# Patient Record
Sex: MALE | Race: BLACK OR AFRICAN AMERICAN | NOT HISPANIC OR LATINO | Employment: FULL TIME | URBAN - METROPOLITAN AREA
[De-identification: names, ages, dates, MRNs, and addresses within clinical notes are randomized per-mention and may not be internally consistent; named-entity substitution may affect disease eponyms.]

---

## 2019-06-28 ENCOUNTER — OFFICE VISIT (OUTPATIENT)
Dept: INTERNAL MEDICINE CLINIC | Facility: CLINIC | Age: 24
End: 2019-06-28
Payer: COMMERCIAL

## 2019-06-28 VITALS
SYSTOLIC BLOOD PRESSURE: 136 MMHG | OXYGEN SATURATION: 98 % | TEMPERATURE: 99.2 F | DIASTOLIC BLOOD PRESSURE: 86 MMHG | HEART RATE: 87 BPM | WEIGHT: 312.8 LBS | BODY MASS INDEX: 40.71 KG/M2

## 2019-06-28 DIAGNOSIS — E66.01 MORBID OBESITY (HCC): ICD-10-CM

## 2019-06-28 DIAGNOSIS — E55.9 VITAMIN D DEFICIENCY: ICD-10-CM

## 2019-06-28 DIAGNOSIS — E78.5 HYPERLIPIDEMIA, UNSPECIFIED HYPERLIPIDEMIA TYPE: ICD-10-CM

## 2019-06-28 DIAGNOSIS — R41.840 ATTENTION DEFICIT: Primary | ICD-10-CM

## 2019-06-28 PROCEDURE — 99203 OFFICE O/P NEW LOW 30 MIN: CPT | Performed by: NURSE PRACTITIONER

## 2019-06-28 PROCEDURE — 1036F TOBACCO NON-USER: CPT | Performed by: NURSE PRACTITIONER

## 2019-06-28 RX ORDER — ATOMOXETINE HYDROCHLORIDE 40 MG/1
1 CAPSULE ORAL 2 TIMES DAILY
COMMUNITY
Start: 2015-12-23 | End: 2019-06-28

## 2019-06-28 RX ORDER — DEXTROAMPHETAMINE SACCHARATE, AMPHETAMINE ASPARTATE MONOHYDRATE, DEXTROAMPHETAMINE SULFATE AND AMPHETAMINE SULFATE 2.5; 2.5; 2.5; 2.5 MG/1; MG/1; MG/1; MG/1
10 CAPSULE, EXTENDED RELEASE ORAL EVERY MORNING
Qty: 30 CAPSULE | Refills: 0 | Status: SHIPPED | OUTPATIENT
Start: 2019-06-28 | End: 2019-07-29 | Stop reason: SDUPTHER

## 2019-06-29 LAB
25(OH)D3 SERPL-MCNC: 10 NG/ML (ref 30–100)
ALBUMIN SERPL-MCNC: 4.3 G/DL (ref 3.6–5.1)
ALBUMIN/GLOB SERPL: 1.3 (CALC) (ref 1–2.5)
ALP SERPL-CCNC: 72 U/L (ref 40–115)
ALT SERPL-CCNC: 11 U/L (ref 9–46)
AST SERPL-CCNC: 15 U/L (ref 10–40)
BASOPHILS # BLD AUTO: 31 CELLS/UL (ref 0–200)
BASOPHILS NFR BLD AUTO: 0.6 %
BILIRUB SERPL-MCNC: 0.5 MG/DL (ref 0.2–1.2)
BUN SERPL-MCNC: 17 MG/DL (ref 7–25)
BUN/CREAT SERPL: NORMAL (CALC) (ref 6–22)
CALCIUM SERPL-MCNC: 9.2 MG/DL (ref 8.6–10.3)
CHLORIDE SERPL-SCNC: 104 MMOL/L (ref 98–110)
CHOLEST SERPL-MCNC: 239 MG/DL
CHOLEST/HDLC SERPL: 6.3 (CALC)
CO2 SERPL-SCNC: 24 MMOL/L (ref 20–32)
CREAT SERPL-MCNC: 1.04 MG/DL (ref 0.6–1.35)
EOSINOPHIL # BLD AUTO: 41 CELLS/UL (ref 15–500)
EOSINOPHIL NFR BLD AUTO: 0.8 %
ERYTHROCYTE [DISTWIDTH] IN BLOOD BY AUTOMATED COUNT: 13.6 % (ref 11–15)
GLOBULIN SER CALC-MCNC: 3.2 G/DL (CALC) (ref 1.9–3.7)
GLUCOSE SERPL-MCNC: 79 MG/DL (ref 65–99)
HCT VFR BLD AUTO: 46.5 % (ref 38.5–50)
HDLC SERPL-MCNC: 38 MG/DL
HGB BLD-MCNC: 15.4 G/DL (ref 13.2–17.1)
LDLC SERPL CALC-MCNC: 178 MG/DL (CALC)
LYMPHOCYTES # BLD AUTO: 1469 CELLS/UL (ref 850–3900)
LYMPHOCYTES NFR BLD AUTO: 28.8 %
MCH RBC QN AUTO: 27.4 PG (ref 27–33)
MCHC RBC AUTO-ENTMCNC: 33.1 G/DL (ref 32–36)
MCV RBC AUTO: 82.6 FL (ref 80–100)
MONOCYTES # BLD AUTO: 479 CELLS/UL (ref 200–950)
MONOCYTES NFR BLD AUTO: 9.4 %
NEUTROPHILS # BLD AUTO: 3080 CELLS/UL (ref 1500–7800)
NEUTROPHILS NFR BLD AUTO: 60.4 %
NONHDLC SERPL-MCNC: 201 MG/DL (CALC)
PLATELET # BLD AUTO: 245 THOUSAND/UL (ref 140–400)
PMV BLD REES-ECKER: 11.5 FL (ref 7.5–12.5)
POTASSIUM SERPL-SCNC: 5 MMOL/L (ref 3.5–5.3)
PROT SERPL-MCNC: 7.5 G/DL (ref 6.1–8.1)
RBC # BLD AUTO: 5.63 MILLION/UL (ref 4.2–5.8)
SL AMB EGFR AFRICAN AMERICAN: 118 ML/MIN/1.73M2
SL AMB EGFR NON AFRICAN AMERICAN: 102 ML/MIN/1.73M2
SODIUM SERPL-SCNC: 138 MMOL/L (ref 135–146)
TRIGL SERPL-MCNC: 105 MG/DL
WBC # BLD AUTO: 5.1 THOUSAND/UL (ref 3.8–10.8)

## 2019-07-01 ENCOUNTER — TELEPHONE (OUTPATIENT)
Dept: INTERNAL MEDICINE CLINIC | Age: 24
End: 2019-07-01

## 2019-07-01 DIAGNOSIS — E55.9 VITAMIN D DEFICIENCY: Primary | ICD-10-CM

## 2019-07-01 RX ORDER — ERGOCALCIFEROL 1.25 MG/1
50000 CAPSULE ORAL WEEKLY
Qty: 12 CAPSULE | Refills: 0 | Status: SHIPPED | OUTPATIENT
Start: 2019-07-01

## 2019-07-01 NOTE — TELEPHONE ENCOUNTER
Left detailed message on machine that vitamin d is low at 10 and will start prescription strength Vit d once weekly for 12 weeks  Advised that [facility] [place]  Advised cholesterol was high but will address at follow up later this month  Asked to call back if any questions

## 2019-07-29 ENCOUNTER — OFFICE VISIT (OUTPATIENT)
Dept: INTERNAL MEDICINE CLINIC | Facility: CLINIC | Age: 24
End: 2019-07-29
Payer: COMMERCIAL

## 2019-07-29 VITALS
DIASTOLIC BLOOD PRESSURE: 80 MMHG | BODY MASS INDEX: 41.45 KG/M2 | SYSTOLIC BLOOD PRESSURE: 128 MMHG | HEART RATE: 81 BPM | TEMPERATURE: 98.5 F | HEIGHT: 72 IN | WEIGHT: 306 LBS | OXYGEN SATURATION: 98 %

## 2019-07-29 DIAGNOSIS — E55.9 VITAMIN D DEFICIENCY: ICD-10-CM

## 2019-07-29 DIAGNOSIS — E66.01 MORBID OBESITY (HCC): ICD-10-CM

## 2019-07-29 DIAGNOSIS — R41.840 ATTENTION DEFICIT: Primary | ICD-10-CM

## 2019-07-29 DIAGNOSIS — E78.5 HYPERLIPIDEMIA, UNSPECIFIED HYPERLIPIDEMIA TYPE: ICD-10-CM

## 2019-07-29 PROCEDURE — 1036F TOBACCO NON-USER: CPT | Performed by: NURSE PRACTITIONER

## 2019-07-29 PROCEDURE — 99214 OFFICE O/P EST MOD 30 MIN: CPT | Performed by: NURSE PRACTITIONER

## 2019-07-29 PROCEDURE — 3008F BODY MASS INDEX DOCD: CPT | Performed by: NURSE PRACTITIONER

## 2019-07-29 RX ORDER — DEXTROAMPHETAMINE SACCHARATE, AMPHETAMINE ASPARTATE MONOHYDRATE, DEXTROAMPHETAMINE SULFATE AND AMPHETAMINE SULFATE 5; 5; 5; 5 MG/1; MG/1; MG/1; MG/1
20 CAPSULE, EXTENDED RELEASE ORAL EVERY MORNING
Qty: 30 CAPSULE | Refills: 0 | Status: SHIPPED | OUTPATIENT
Start: 2019-07-29 | End: 2019-09-06 | Stop reason: SDUPTHER

## 2019-07-29 NOTE — PROGRESS NOTES
Assessment/Plan:       Problem List Items Addressed This Visit        Other    Attention deficit - Primary     Increase Adderall XR to 20mg daily  Follow up 3 months          Relevant Medications    amphetamine-dextroamphetamine (ADDERALL XR) 20 MG 24 hr capsule    Hyperlipidemia     Discussed healthy diet  Encouraged to start exercising  Recheck in 3 months          Relevant Orders    Lipid Panel with Direct LDL reflex    Vitamin D deficiency     Continue ergocalciferol 50,000 weekly  Once completed, started vitamin D 3 2000 units daily         Morbid obesity ([address] )     Lost 6 lbs in last month  Started Keto diet  Encouraged exercise               BMI Counseling: Body mass index is 41 5 kg/m²  Discussed the patient's BMI with him  The BMI is above average  BMI counseling and education was provided to the patient  Nutrition recommendations include reducing portion sizes, decreasing overall calorie intake, reducing intake of saturated fat and trans fat and reducing intake of cholesterol  Exercise recommendations include moderate aerobic physical activity for 150 minutes/week  Patient is UTD on Tdap    Subjective:      Patient ID: Sallie Chowdhury is a 26 y o  male  HPI     ADHD   Patient presents today for 1 month follow up ADHD  He was started on Adderrall XR 10mg daily  He is taking it daily in the morning  He is not noticing a significant difference throughout the day  When he is doing homework at night he is noticing some improvement  The first week on the medication he was mildly constipated and feeling tired  He reports s e  Resolved  Vitamin D deficiency  Last labs 6/28/2019 - Vitamin D hydroxy 10  He has since started ergocalciferol 64958 units weekly  +fatigue    Hyperlipidemia  Yumi Faria is a 26 y o  male who presents for follow up of dyslipidemia  A repeat fasting lipid profile was done  Total cholesterol 239 ; Triglycerides 105 ; HDL 38 ;     Previous history of cardiac disease includes: none Lipid abnormalities are newly identified  The patient exercises never  Morbid obesity  Recently started keto diet  He has lost 6 lbs in the last month  No routine exercise    The following portions of the patient's history were reviewed and updated as appropriate: allergies, current medications, past family history, past medical history, past social history, past surgical history and problem list     Review of Systems   Constitutional: Negative for activity change, appetite change, chills and fever  Respiratory: Negative for cough, chest tightness, shortness of breath and wheezing  Cardiovascular: Negative for chest pain, palpitations and leg swelling  Gastrointestinal: Positive for abdominal pain (epigastric 1 episoide in past month)  Negative for abdominal distention, blood in stool, constipation, diarrhea, nausea and vomiting  Neurological: Negative for dizziness, light-headedness and headaches  Past Medical History:   Diagnosis Date    ADHD          Current Outpatient Medications:     amphetamine-dextroamphetamine (ADDERALL XR) 20 MG 24 hr capsule, Take 1 capsule (20 mg total) by mouth every morningMax Daily Amount: 20 mg, Disp: 30 capsule, Rfl: 0    ergocalciferol (VITAMIN D2) 50,000 units, Take 1 capsule (50,000 Units total) by mouth once a week, Disp: 12 capsule, Rfl: 0    No Known Allergies    Social History   Past Surgical History:   Procedure Laterality Date    NO PAST SURGERIES       Family History   Problem Relation Age of Onset    Asthma Mother     Hypertension Mother     Asthma Brother     No Known Problems Father        Objective:  /80 (BP Location: Left arm, Patient Position: Sitting, Cuff Size: Large)   Pulse 81   Temp 98 5 °F (36 9 °C) (Oral)   Ht 6' (1 829 m)   Wt (!) 139 kg (306 lb)   SpO2 98% Comment: ra  BMI 41 50 kg/m²      Physical Exam   Constitutional: He is oriented to person, place, and time  He appears well-developed and well-nourished   No distress  Morbidly obese   HENT:   Head: Normocephalic and atraumatic  Eyes: Pupils are equal, round, and reactive to light  Conjunctivae and EOM are normal    Neck: Normal range of motion  Neck supple  Carotid bruit is not present  Cardiovascular: Normal rate, regular rhythm and normal heart sounds  No murmur heard  Pulmonary/Chest: Effort normal and breath sounds normal  No respiratory distress  He has no wheezes  He has no rales  Abdominal: Soft  Bowel sounds are normal  He exhibits no distension and no mass  There is no tenderness  Musculoskeletal: He exhibits no edema  Neurological: He is alert and oriented to person, place, and time  Skin: Skin is warm and dry  Capillary refill takes less than 2 seconds  He is not diaphoretic  Psychiatric: He has a normal mood and affect  His behavior is normal    Vitals reviewed

## 2019-07-29 NOTE — PATIENT INSTRUCTIONS
Increase Adderrall to 20mg once daily  New script sent to pharmacy today     Healthy diet to lower cholesterol -  Low cholesterol  Low fat dairy, lean protein, increase fish and healthy fats (canola oil, olive oil, salmon, avocado)   Recheck cholesterol in 3 months prior to follow up      Continue vitamin d weekly supplement   Once finished start Vitamin D3 2000 units daily over the counter

## 2019-09-06 DIAGNOSIS — R41.840 ATTENTION DEFICIT: ICD-10-CM

## 2019-09-06 RX ORDER — DEXTROAMPHETAMINE SACCHARATE, AMPHETAMINE ASPARTATE MONOHYDRATE, DEXTROAMPHETAMINE SULFATE AND AMPHETAMINE SULFATE 5; 5; 5; 5 MG/1; MG/1; MG/1; MG/1
20 CAPSULE, EXTENDED RELEASE ORAL EVERY MORNING
Qty: 30 CAPSULE | Refills: 0 | Status: SHIPPED | OUTPATIENT
Start: 2019-09-06 | End: 2019-10-03 | Stop reason: SDUPTHER

## 2019-09-06 NOTE — TELEPHONE ENCOUNTER
MED: [facility]   SUPPLY: [employer]  PHARMACY: [facility]   PATIENT PHONE #: [phone]  LAST OV: 7/29/2019  UPCOMING OV: 10/4/2019

## 2019-10-03 ENCOUNTER — OFFICE VISIT (OUTPATIENT)
Dept: INTERNAL MEDICINE CLINIC | Facility: CLINIC | Age: 24
End: 2019-10-03
Payer: COMMERCIAL

## 2019-10-03 VITALS
SYSTOLIC BLOOD PRESSURE: 130 MMHG | HEIGHT: 72 IN | TEMPERATURE: 98.3 F | WEIGHT: 308 LBS | DIASTOLIC BLOOD PRESSURE: 90 MMHG | BODY MASS INDEX: 41.72 KG/M2 | HEART RATE: 84 BPM | OXYGEN SATURATION: 97 %

## 2019-10-03 DIAGNOSIS — E78.5 HYPERLIPIDEMIA, UNSPECIFIED HYPERLIPIDEMIA TYPE: ICD-10-CM

## 2019-10-03 DIAGNOSIS — E66.01 MORBID OBESITY (HCC): ICD-10-CM

## 2019-10-03 DIAGNOSIS — E55.9 VITAMIN D DEFICIENCY: ICD-10-CM

## 2019-10-03 DIAGNOSIS — R41.840 ATTENTION DEFICIT: Primary | ICD-10-CM

## 2019-10-03 DIAGNOSIS — R03.0 ELEVATED BP WITHOUT DIAGNOSIS OF HYPERTENSION: ICD-10-CM

## 2019-10-03 PROCEDURE — 3008F BODY MASS INDEX DOCD: CPT | Performed by: NURSE PRACTITIONER

## 2019-10-03 PROCEDURE — 99214 OFFICE O/P EST MOD 30 MIN: CPT | Performed by: NURSE PRACTITIONER

## 2019-10-03 RX ORDER — DEXTROAMPHETAMINE SACCHARATE, AMPHETAMINE ASPARTATE MONOHYDRATE, DEXTROAMPHETAMINE SULFATE AND AMPHETAMINE SULFATE 5; 5; 5; 5 MG/1; MG/1; MG/1; MG/1
20 CAPSULE, EXTENDED RELEASE ORAL EVERY MORNING
Qty: 30 CAPSULE | Refills: 0 | Status: SHIPPED | OUTPATIENT
Start: 2019-10-03 | End: 2019-10-25 | Stop reason: SDUPTHER

## 2019-10-03 NOTE — ASSESSMENT & PLAN NOTE
Significantly elevated LDL on labs 3 months ago  Advised patient to go for repeat labs  Discussed importance of dietary changes and exercise

## 2019-10-03 NOTE — ASSESSMENT & PLAN NOTE
Continue ergo calciferol 61873 units weekly  Once completed start vitamin-D 3 2000 International Units daily

## 2019-10-03 NOTE — PATIENT INSTRUCTIONS
Continue same medications  When you complete the vitamin D start Vitamin D3 2000 units daily over the counter    Work on diet and exercise    Go for labs    Follow up in 3 months

## 2019-10-03 NOTE — ASSESSMENT & PLAN NOTE
Offered weight management referral but patient declined  Discussed with him importance of starting daily routine of healthy diet with exercise

## 2019-10-25 DIAGNOSIS — R41.840 ATTENTION DEFICIT: ICD-10-CM

## 2019-10-26 RX ORDER — DEXTROAMPHETAMINE SACCHARATE, AMPHETAMINE ASPARTATE MONOHYDRATE, DEXTROAMPHETAMINE SULFATE AND AMPHETAMINE SULFATE 5; 5; 5; 5 MG/1; MG/1; MG/1; MG/1
20 CAPSULE, EXTENDED RELEASE ORAL EVERY MORNING
Qty: 30 CAPSULE | Refills: 0 | Status: SHIPPED | OUTPATIENT
Start: 2019-10-26 | End: 2019-12-11 | Stop reason: SDUPTHER

## 2019-10-31 ENCOUNTER — APPOINTMENT (OUTPATIENT)
Dept: LAB | Age: 24
End: 2019-10-31
Payer: COMMERCIAL

## 2019-10-31 DIAGNOSIS — E78.5 HYPERLIPIDEMIA, UNSPECIFIED HYPERLIPIDEMIA TYPE: ICD-10-CM

## 2019-10-31 LAB
CHOLEST SERPL-MCNC: 219 MG/DL (ref 50–200)
HDLC SERPL-MCNC: 34 MG/DL
LDLC SERPL CALC-MCNC: 159 MG/DL (ref 0–100)
TRIGL SERPL-MCNC: 129 MG/DL

## 2019-10-31 PROCEDURE — 80061 LIPID PANEL: CPT

## 2019-10-31 PROCEDURE — 36415 COLL VENOUS BLD VENIPUNCTURE: CPT

## 2019-12-11 DIAGNOSIS — R41.840 ATTENTION DEFICIT: ICD-10-CM

## 2019-12-11 NOTE — TELEPHONE ENCOUNTER
Last O/V: 10/03/19  Next O/V: 01/06/20    Medication requested:  Adderall 20 MG   Pharmacy: [facility] in [place]      PLEASE ADVISE CALEB NO LONGER IN OFFICE

## 2019-12-14 RX ORDER — DEXTROAMPHETAMINE SACCHARATE, AMPHETAMINE ASPARTATE MONOHYDRATE, DEXTROAMPHETAMINE SULFATE AND AMPHETAMINE SULFATE 5; 5; 5; 5 MG/1; MG/1; MG/1; MG/1
20 CAPSULE, EXTENDED RELEASE ORAL EVERY MORNING
Qty: 30 CAPSULE | Refills: 0 | Status: SHIPPED | OUTPATIENT
Start: 2019-12-14 | End: 2020-02-27 | Stop reason: SDUPTHER

## 2020-02-27 DIAGNOSIS — R41.840 ATTENTION DEFICIT: ICD-10-CM

## 2020-02-27 RX ORDER — DEXTROAMPHETAMINE SACCHARATE, AMPHETAMINE ASPARTATE MONOHYDRATE, DEXTROAMPHETAMINE SULFATE AND AMPHETAMINE SULFATE 5; 5; 5; 5 MG/1; MG/1; MG/1; MG/1
20 CAPSULE, EXTENDED RELEASE ORAL EVERY MORNING
Qty: 30 CAPSULE | Refills: 0 | Status: SHIPPED | OUTPATIENT
Start: 2020-02-27 | End: 2020-05-19 | Stop reason: SDUPTHER

## 2020-03-13 ENCOUNTER — OFFICE VISIT (OUTPATIENT)
Dept: INTERNAL MEDICINE CLINIC | Facility: CLINIC | Age: 25
End: 2020-03-13
Payer: COMMERCIAL

## 2020-03-13 VITALS
WEIGHT: 315 LBS | TEMPERATURE: 98.8 F | DIASTOLIC BLOOD PRESSURE: 88 MMHG | BODY MASS INDEX: 42.66 KG/M2 | HEIGHT: 72 IN | OXYGEN SATURATION: 96 % | HEART RATE: 88 BPM | SYSTOLIC BLOOD PRESSURE: 124 MMHG

## 2020-03-13 DIAGNOSIS — R41.840 ATTENTION DEFICIT: Primary | ICD-10-CM

## 2020-03-13 DIAGNOSIS — E66.01 MORBID OBESITY (HCC): ICD-10-CM

## 2020-03-13 DIAGNOSIS — E55.9 VITAMIN D DEFICIENCY: ICD-10-CM

## 2020-03-13 DIAGNOSIS — R03.0 ELEVATED BP WITHOUT DIAGNOSIS OF HYPERTENSION: ICD-10-CM

## 2020-03-13 DIAGNOSIS — E78.5 HYPERLIPIDEMIA, UNSPECIFIED HYPERLIPIDEMIA TYPE: ICD-10-CM

## 2020-03-13 DIAGNOSIS — K21.9 GASTROESOPHAGEAL REFLUX DISEASE, ESOPHAGITIS PRESENCE NOT SPECIFIED: ICD-10-CM

## 2020-03-13 PROCEDURE — 3008F BODY MASS INDEX DOCD: CPT | Performed by: NURSE PRACTITIONER

## 2020-03-13 PROCEDURE — 99214 OFFICE O/P EST MOD 30 MIN: CPT | Performed by: NURSE PRACTITIONER

## 2020-03-13 PROCEDURE — 1036F TOBACCO NON-USER: CPT | Performed by: NURSE PRACTITIONER

## 2020-03-13 RX ORDER — DEXTROAMPHETAMINE SACCHARATE, AMPHETAMINE ASPARTATE, DEXTROAMPHETAMINE SULFATE AND AMPHETAMINE SULFATE 1.25; 1.25; 1.25; 1.25 MG/1; MG/1; MG/1; MG/1
TABLET ORAL
Qty: 30 TABLET | Refills: 0 | Status: SHIPPED | OUTPATIENT
Start: 2020-03-13 | End: 2020-05-19 | Stop reason: SDUPTHER

## 2020-03-13 NOTE — ASSESSMENT & PLAN NOTE
Continue Adderall XR 20 mg daily in the morning  Start Adderall immediate release 5 mg in the afternoon around 2-3 p m

## 2020-03-13 NOTE — PROGRESS NOTES
Assessment/Plan:    Problem List Items Addressed This Visit        Digestive    Acid reflux     Intermittent  Educated on lifestyle changes- foods to avoid, small portions throughout the day, avoid eating close to bedtime  Can use Pepcid 10 mg over-the-counter 1 to 2 times a day as needed            Other    Attention deficit - Primary     Continue Adderall XR 20 mg daily in the morning  Start Adderall immediate release 5 mg in the afternoon around 2-3 p m  Relevant Medications    amphetamine-dextroamphetamine (ADDERALL) 5 MG tablet    Hyperlipidemia     Discussed importance of exercise routine and healthy diet changes  Update lipid panel         Relevant Orders    CBC and differential    Comprehensive metabolic panel    Lipid Panel with Direct LDL reflex    Vitamin D deficiency     Continue vitamin-D 2000 International Units daily  Recheck vitamin-D level         Relevant Orders    Vitamin D 25 hydroxy    Morbid obesity ([address] )     Discussed with patient importance of weight loss  Educated on at exercise and diet         Relevant Orders    Lipid Panel with Direct LDL reflex    Elevated BP without diagnosis of hypertension     Educated on dietary changes as well as importance of weight loss  Continue to monitor               BMI Counseling: Body mass index is 43 88 kg/m²  Discussed the patient's BMI with him  The BMI is above normal  Nutrition recommendations include reducing portion sizes, decreasing overall calorie intake, 3-5 servings of fruits/vegetables daily and reducing fast food intake  Exercise recommendations include moderate aerobic physical activity for 150 minutes/week  He is not exercising or eating well  Discussed at length improvements to make to his diet and importance of weight loss  Discussed at length importance of exercising routinely  Recommended he start using his treadmill at home for at least 30 minutes a day    M*Modal software was used to dictate this note    It may contain errors with dictating incorrect words or incorrect spelling  Please contact the provider directly with any questions  Subjective:      Patient ID: Tigist Zapata is a 27 y o  male  HPI     Patient presents for 6 month follow up of ADD  He is currently on Adderall XR 20mg which he feels like is working well for him to focus  He does feel that he starts "crashing" around 3pm  This is difficult for him because he feels extremely tired at 3pm and then needs to get his homework done  He has tried not taking the Adderall and when he doesn't take it he doesn't experience that crash  Hyperlipidemia - will update lipid panel  No current medications  Diet has been poor without exercise    Vitamin-D deficiency - patient is currently taking over-the-counter vitamin-D 2000 International Units daily  Last vitamin-D level 10  Update labs    Morbid obesity- continue to educate patient on the importance of weight loss  Discussed at length diet and exercise  Reflux- patient has noted that he has been experiencing acid reflux intermittently  Typically occurs with certain foods or if he eats too close to bedtime  The following portions of the patient's history were reviewed and updated as appropriate: allergies, current medications, past family history, past medical history, past social history, past surgical history and problem list     Review of Systems   Constitutional: Negative for activity change, appetite change, chills and fever  Respiratory: Negative for chest tightness, shortness of breath and wheezing  Cardiovascular: Negative for chest pain, palpitations and leg swelling  Gastrointestinal: Negative for abdominal distention, abdominal pain, constipation, diarrhea, nausea and vomiting  Neurological: Negative for dizziness, light-headedness and headaches  Psychiatric/Behavioral: Negative for decreased concentration  The patient is not nervous/anxious            Past Medical History:   Diagnosis Date  ADHD          Current Outpatient Medications:     amphetamine-dextroamphetamine (ADDERALL XR) 20 MG 24 hr capsule, Take 1 capsule (20 mg total) by mouth every morningMax Daily Amount: 20 mg, Disp: 30 capsule, Rfl: 0    amphetamine-dextroamphetamine (ADDERALL) 5 MG tablet, Take 5mg daily by mouth every afternoon, Disp: 30 tablet, Rfl: 0    ergocalciferol (VITAMIN D2) 50,000 units, Take 1 capsule (50,000 Units total) by mouth once a week, Disp: 12 capsule, Rfl: 0    No Known Allergies    Social History   Past Surgical History:   Procedure Laterality Date    NO PAST SURGERIES       Family History   Problem Relation Age of Onset    Asthma Mother     Hypertension Mother     Asthma Brother     No Known Problems Father        Objective:  /88 (BP Location: Left arm, Patient Position: Sitting, Cuff Size: Large)   Pulse 88   Temp 98 8 °F (37 1 °C) (Oral)   Ht 6' 0 05" (1 83 m)   Wt (!) 147 kg (324 lb)   SpO2 96%   BMI 43 88 kg/m²      Physical Exam   Constitutional: He is oriented to person, place, and time  He appears well-developed and well-nourished  No distress  Morbidly obese   HENT:   Head: Normocephalic and atraumatic  Right Ear: Tympanic membrane and external ear normal    Left Ear: Tympanic membrane and external ear normal    Nose: Nose normal    Mouth/Throat: Oropharynx is clear and moist  No oropharyngeal exudate, posterior oropharyngeal edema or posterior oropharyngeal erythema  Eyes: Pupils are equal, round, and reactive to light  Conjunctivae and EOM are normal    Neck: Normal range of motion  Neck supple  Cardiovascular: Normal rate, regular rhythm and normal heart sounds  No murmur heard  Pulmonary/Chest: Effort normal and breath sounds normal  No respiratory distress  He has no decreased breath sounds  He has no wheezes  He has no rhonchi  Musculoskeletal: He exhibits no edema  Lymphadenopathy:     He has no cervical adenopathy     Neurological: He is alert and oriented to person, place, and time  Skin: Skin is warm and dry  He is not diaphoretic  Psychiatric: He has a normal mood and affect  His behavior is normal    Vitals reviewed

## 2020-03-13 NOTE — ASSESSMENT & PLAN NOTE
Intermittent  Educated on lifestyle changes- foods to avoid, small portions throughout the day, avoid eating close to bedtime  Can use Pepcid 10 mg over-the-counter 1 to 2 times a day as needed

## 2020-03-13 NOTE — PATIENT INSTRUCTIONS
Continue adderall XR 20mg daily in the morning and start immediate release 5mg around 2-230pm    Start exercising by walking 30 minutes a day on the treadmill - continue to increase intensity     Healthy diet - more fruits and vegetables, less processed foods     Can start OTC pepcid 10mg 1-2x a day as needed for acid reflux    Go for fasting labs prior to follow up in 3 months

## 2020-05-19 DIAGNOSIS — R41.840 ATTENTION DEFICIT: ICD-10-CM

## 2020-05-19 RX ORDER — DEXTROAMPHETAMINE SACCHARATE, AMPHETAMINE ASPARTATE, DEXTROAMPHETAMINE SULFATE AND AMPHETAMINE SULFATE 1.25; 1.25; 1.25; 1.25 MG/1; MG/1; MG/1; MG/1
TABLET ORAL
Qty: 30 TABLET | Refills: 0 | Status: SHIPPED | OUTPATIENT
Start: 2020-05-19

## 2020-05-19 RX ORDER — DEXTROAMPHETAMINE SACCHARATE, AMPHETAMINE ASPARTATE MONOHYDRATE, DEXTROAMPHETAMINE SULFATE AND AMPHETAMINE SULFATE 5; 5; 5; 5 MG/1; MG/1; MG/1; MG/1
20 CAPSULE, EXTENDED RELEASE ORAL EVERY MORNING
Qty: 30 CAPSULE | Refills: 0 | Status: SHIPPED | OUTPATIENT
Start: 2020-05-19 | End: 2020-06-30 | Stop reason: SDUPTHER

## 2020-06-30 ENCOUNTER — OFFICE VISIT (OUTPATIENT)
Dept: INTERNAL MEDICINE CLINIC | Facility: CLINIC | Age: 25
End: 2020-06-30
Payer: COMMERCIAL

## 2020-06-30 VITALS
DIASTOLIC BLOOD PRESSURE: 82 MMHG | TEMPERATURE: 98.3 F | BODY MASS INDEX: 41.75 KG/M2 | SYSTOLIC BLOOD PRESSURE: 124 MMHG | WEIGHT: 315 LBS | HEART RATE: 82 BPM | HEIGHT: 73 IN | OXYGEN SATURATION: 97 %

## 2020-06-30 DIAGNOSIS — E66.01 MORBID OBESITY (HCC): ICD-10-CM

## 2020-06-30 DIAGNOSIS — R41.840 ATTENTION DEFICIT: Primary | ICD-10-CM

## 2020-06-30 PROCEDURE — 1036F TOBACCO NON-USER: CPT | Performed by: NURSE PRACTITIONER

## 2020-06-30 PROCEDURE — 99213 OFFICE O/P EST LOW 20 MIN: CPT | Performed by: NURSE PRACTITIONER

## 2020-06-30 PROCEDURE — 3008F BODY MASS INDEX DOCD: CPT | Performed by: NURSE PRACTITIONER

## 2020-06-30 RX ORDER — DEXTROAMPHETAMINE SACCHARATE, AMPHETAMINE ASPARTATE MONOHYDRATE, DEXTROAMPHETAMINE SULFATE AND AMPHETAMINE SULFATE 5; 5; 5; 5 MG/1; MG/1; MG/1; MG/1
20 CAPSULE, EXTENDED RELEASE ORAL EVERY MORNING
Qty: 30 CAPSULE | Refills: 0 | Status: SHIPPED | OUTPATIENT
Start: 2020-06-30

## 2021-04-16 ENCOUNTER — IMMUNIZATIONS (OUTPATIENT)
Dept: FAMILY MEDICINE CLINIC | Facility: HOSPITAL | Age: 26
End: 2021-04-16

## 2021-04-16 DIAGNOSIS — Z23 ENCOUNTER FOR IMMUNIZATION: Primary | ICD-10-CM

## 2021-04-16 PROCEDURE — 0011A SARS-COV-2 / COVID-19 MRNA VACCINE (MODERNA) 100 MCG: CPT

## 2021-04-16 PROCEDURE — 91301 SARS-COV-2 / COVID-19 MRNA VACCINE (MODERNA) 100 MCG: CPT

## 2021-05-17 ENCOUNTER — IMMUNIZATIONS (OUTPATIENT)
Dept: FAMILY MEDICINE CLINIC | Facility: HOSPITAL | Age: 26
End: 2021-05-17

## 2021-05-17 DIAGNOSIS — Z23 ENCOUNTER FOR IMMUNIZATION: Primary | ICD-10-CM

## 2021-05-17 PROCEDURE — 0012A SARS-COV-2 / COVID-19 MRNA VACCINE (MODERNA) 100 MCG: CPT

## 2021-05-17 PROCEDURE — 91301 SARS-COV-2 / COVID-19 MRNA VACCINE (MODERNA) 100 MCG: CPT

## 2022-12-23 NOTE — PROGRESS NOTES
Assessment/Plan:       Problem List Items Addressed This Visit        Other    Attention deficit - Primary     Continue Adderall XR 20 mg daily  Follow-up in 3 months         Relevant Medications    amphetamine-dextroamphetamine (ADDERALL XR) 20 MG 24 hr capsule    Hyperlipidemia     Significantly elevated LDL on labs 3 months ago  Advised patient to go for repeat labs  Discussed importance of dietary changes and exercise         Vitamin D deficiency     Continue ergo calciferol 58559 units weekly  Once completed start vitamin-D 3 2000 International Units daily         Morbid obesity ([address] )     Offered weight management referral but patient declined  Discussed with him importance of starting daily routine of healthy diet with exercise         Elevated BP without diagnosis of hypertension     Advised low-sodium diet and weight loss  Will monitor closely                 BMI Counseling: Body mass index is 41 77 kg/m²  Discussed the patient's BMI with him  The BMI is above normal  Nutrition recommendations include reducing portion sizes, decreasing overall calorie intake, 3-5 servings of fruits/vegetables daily, reducing fast food intake and consuming healthier snacks  Exercise recommendations include moderate aerobic physical activity for 150 minutes/week  Subjective:      Patient ID: Susie Wright is a 27 y o  male  HPI     ADHD  Doing well on adderall XR 20mg daily  He is currently in his senior year of college  He notices on days that he forgets the Adderall that he is more distracted and less motivated  He denies any appetite suppression or palpitations    Vitamin D deficiency  He is currently on ergocalciferol 18656 units weekly   He states he has been feeling better since starting the medication    Obesity  Lacks motivation to exercise   He has the opportunity to use the gym but does not go  Struggling with diet  He was on the keto diet for a while but was time consuming     Currently he has been eating a lot of processed food and junk food  He is not interested in seeing weight management at this time    HLD  Cholesterol was previously significantly elevated and he was advised to recheck in 3 months  He forgot to have his labs completed prior to the visit  Diet has been poor and no exercise    The following portions of the patient's history were reviewed and updated as appropriate: allergies, current medications, past family history, past medical history, past social history, past surgical history and problem list     Review of Systems   Constitutional: Negative for activity change, appetite change, chills, fatigue and fever  Respiratory: Negative for cough, chest tightness, shortness of breath and wheezing  Cardiovascular: Negative for chest pain and palpitations  Neurological: Negative for dizziness, light-headedness and headaches  Past Medical History:   Diagnosis Date    ADHD          Current Outpatient Medications:     amphetamine-dextroamphetamine (ADDERALL XR) 20 MG 24 hr capsule, Take 1 capsule (20 mg total) by mouth every morningMax Daily Amount: 20 mg, Disp: 30 capsule, Rfl: 0    ergocalciferol (VITAMIN D2) 50,000 units, Take 1 capsule (50,000 Units total) by mouth once a week, Disp: 12 capsule, Rfl: 0    No Known Allergies    Social History   Past Surgical History:   Procedure Laterality Date    NO PAST SURGERIES       Family History   Problem Relation Age of Onset    Asthma Mother     Hypertension Mother     Asthma Brother     No Known Problems Father        Objective:  /90 (BP Location: Left arm, Patient Position: Sitting, Cuff Size: Large)   Pulse 84   Temp 98 3 °F (36 8 °C) (Oral)   Ht 6' (1 829 m)   Wt (!) 140 kg (308 lb)   SpO2 97%   BMI 41 77 kg/m²      Physical Exam   Constitutional: He is oriented to person, place, and time  He appears well-developed and well-nourished  No distress  Morbidly obese   HENT:   Head: Normocephalic and atraumatic     Right Ear: Tympanic membrane and external ear normal    Left Ear: Tympanic membrane and external ear normal    Nose: Nose normal    Mouth/Throat: Oropharynx is clear and moist  No oropharyngeal exudate, posterior oropharyngeal edema or posterior oropharyngeal erythema  Eyes: Pupils are equal, round, and reactive to light  Conjunctivae and EOM are normal    Neck: Normal range of motion  Neck supple  Cardiovascular: Normal rate, regular rhythm and normal heart sounds  No murmur heard  Pulmonary/Chest: Effort normal and breath sounds normal  No respiratory distress  He has no decreased breath sounds  He has no wheezes  He has no rhonchi  Musculoskeletal: He exhibits no edema  Lymphadenopathy:     He has no cervical adenopathy  Neurological: He is alert and oriented to person, place, and time  Skin: Skin is warm and dry  He is not diaphoretic  Psychiatric: He has a normal mood and affect  His behavior is normal    Vitals reviewed  no

## 2023-01-05 ENCOUNTER — OFFICE VISIT (OUTPATIENT)
Dept: INTERNAL MEDICINE CLINIC | Facility: OTHER | Age: 28
End: 2023-01-05

## 2023-01-05 VITALS
BODY MASS INDEX: 42.66 KG/M2 | DIASTOLIC BLOOD PRESSURE: 84 MMHG | TEMPERATURE: 97.7 F | WEIGHT: 315 LBS | SYSTOLIC BLOOD PRESSURE: 136 MMHG | OXYGEN SATURATION: 97 % | HEART RATE: 97 BPM | HEIGHT: 72 IN

## 2023-01-05 DIAGNOSIS — Z82.49 FAMILY HISTORY OF PREMATURE CAD: ICD-10-CM

## 2023-01-05 DIAGNOSIS — R41.840 ATTENTION DEFICIT: ICD-10-CM

## 2023-01-05 DIAGNOSIS — R00.2 PALPITATIONS: ICD-10-CM

## 2023-01-05 DIAGNOSIS — I10 HYPERTENSION, UNSPECIFIED TYPE: ICD-10-CM

## 2023-01-05 DIAGNOSIS — Z13.0 SCREENING FOR DEFICIENCY ANEMIA: ICD-10-CM

## 2023-01-05 DIAGNOSIS — E66.01 MORBID OBESITY (HCC): ICD-10-CM

## 2023-01-05 DIAGNOSIS — R03.0 ELEVATED BLOOD-PRESSURE READING WITHOUT DIAGNOSIS OF HYPERTENSION: ICD-10-CM

## 2023-01-05 DIAGNOSIS — Z13.1 SCREENING FOR DIABETES MELLITUS: ICD-10-CM

## 2023-01-05 DIAGNOSIS — Z00.00 ANNUAL PHYSICAL EXAM: Primary | ICD-10-CM

## 2023-01-05 DIAGNOSIS — E55.9 VITAMIN D DEFICIENCY: ICD-10-CM

## 2023-01-05 DIAGNOSIS — E78.00 HYPERCHOLESTEROLEMIA: ICD-10-CM

## 2023-01-05 LAB — ECG INTERP DURING EX: NORMAL MS

## 2023-01-05 RX ORDER — DEXTROAMPHETAMINE SACCHARATE, AMPHETAMINE ASPARTATE, DEXTROAMPHETAMINE SULFATE AND AMPHETAMINE SULFATE 1.25; 1.25; 1.25; 1.25 MG/1; MG/1; MG/1; MG/1
TABLET ORAL
Qty: 30 TABLET | Refills: 0 | Status: CANCELLED | OUTPATIENT
Start: 2023-01-05

## 2023-01-05 NOTE — PATIENT INSTRUCTIONS

## 2023-01-05 NOTE — PROGRESS NOTES
ADULT ANNUAL [address] [facility] PRIMARY CARE [place]    NAME: Yumi Faria  AGE: 24 y o  SEX: male  : 1995     DATE: 2023     Assessment and Plan:     Problem List Items Addressed This Visit        Cardiovascular and Mediastinum    Hypertension    Relevant Orders    Echo stress test, exercise       Other    Attention deficit     - hold off on restarting adderall until cardiac workup is completed  - pt with c/o palpitations with exercise         Vitamin D deficiency    Relevant Orders    Vitamin D 25 hydroxy    Morbid obesity ([address] )    Relevant Orders    TSH, 3rd generation with Free T4 reflex    Annual physical exam - Primary     - update baseline labs  - discussed importance of healthy diet and routine exercise with goal of weight loss  - monitor BP  - follow up 1 month         Palpitations     - complaints of palpitations while exercises  Associated SOB on exertion  - EKG normal sinus rhythm, nonspecific T wave abnormality  - fam hx of premature CAD x 2 in his mother   - pt with hypertension and HLD  - check stress echo and follow up 1 month  - discussed w/ dr Viola Sanon ECG (Completed)    Echo stress test, exercise    Family history of premature CAD     - fam hx if MI in his mother in late 46s and early 62s x 2           Relevant Orders    Echo stress test, exercise   Other Visit Diagnoses     Screening for deficiency anemia        Relevant Orders    CBC and differential    Screening for diabetes mellitus        Relevant Orders    Comprehensive metabolic panel    Hypercholesterolemia        Relevant Orders    Lipid Panel with Direct LDL reflex    Echo stress test, exercise    Elevated blood-pressure reading without diagnosis of hypertension        Relevant Orders    UA w Reflex to Microscopic w Reflex to Culture -Lab Collect          Immunizations and preventive care screenings were discussed with patient today  Appropriate education was printed on patient's after visit summary  Return in about 1 month (around 2/5/2023)  Chief Complaint:     Chief Complaint   Patient presents with   • Physical Exam     Patient here for physical  Not having any problems  States has nt been in because had no coverage  Does not want the flu shot  Covid shots has 2  Wants to discuss going back on ADHD med  • attention deficit      History of Present Illness:     Adult Annual Physical   Patient here for a comprehensive physical exam  Last seen in our office June 2020  He was previously on adderall for his ADHD but has been off the medication for 3 years  He feels he has poor concentration and especially has trouble controlling his eating which he feels he lacks impulse control  He would like to consider going back on medication  Diet and Physical Activity  · Diet/Nutrition: poor diet  He has been tracking his calories recently, now trying to keep himself under 2500  · Exercise: no formal exercise  Depression Screening  PHQ-2/9 Depression Screening    Little interest or pleasure in doing things: 0 - not at all  Feeling down, depressed, or hopeless: 0 - not at all  PHQ-2 Score: 0  PHQ-2 Interpretation: Negative depression screen       General Health  · Sleep: sleeps well  · Hearing: normal - bilateral   · Vision: he reports occasional blurred vision when staring at a sceen too long  · Dental: regular dental visits   Health  · History of STDs?: no      Review of Systems:     Review of Systems   Constitutional: Negative for activity change, appetite change, chills, diaphoresis, fatigue, fever and unexpected weight change  Eyes: Positive for visual disturbance (some blurred vision with staring at screens)  Respiratory: Negative for cough, chest tightness, shortness of breath and wheezing  Cardiovascular: Positive for palpitations (flutter when he is exercising)  Negative for chest pain     Gastrointestinal: Negative for abdominal distention, abdominal pain, blood in stool, constipation, diarrhea, nausea and vomiting  Genitourinary: Negative for difficulty urinating, dysuria, frequency, hematuria and urgency  Musculoskeletal: Negative for arthralgias, joint swelling and myalgias  Skin: Negative for rash  Neurological: Negative for dizziness, syncope, light-headedness and headaches  Psychiatric/Behavioral: Positive for decreased concentration  Negative for dysphoric mood and sleep disturbance  The patient is not nervous/anxious  Past Medical History:     Past Medical History:   Diagnosis Date   • ADHD       Past Surgical History:     Past Surgical History:   Procedure Laterality Date   • NO PAST SURGERIES     • ROOT CANAL  11/2022      Social History:     Social History     Socioeconomic History   • Marital status: Single     Spouse name: None   • Number of children: None   • Years of education: None   • Highest education level: None   Occupational History   • None   Tobacco Use   • Smoking status: Never   • Smokeless tobacco: Never   Vaping Use   • Vaping Use: Never used   Substance and Sexual Activity   • Alcohol use: Never   • Drug use: Never   • Sexual activity: None   Other Topics Concern   • None   Social History Narrative   • None     Social Determinants of Health     Financial Resource Strain: Not on file   Food Insecurity: Not on file   Transportation Needs: Not on file   Physical Activity: Not on file   Stress: Not on file   Social Connections: Not on file   Intimate Partner Violence: Not on file   Housing Stability: Not on file      Family History:     Family History   Problem Relation Age of Onset   • Asthma Mother    • Hypertension Mother    • Heart attack Mother    • No Known Problems Father    • Asthma Brother       Current Medications:     No current outpatient medications on file  No current facility-administered medications for this visit        Allergies:     No Known Allergies   Physical Exam:     /84 (BP Location: Left arm, Patient Position: Sitting, Cuff Size: Large)   Pulse 97   Temp 97 7 °F (36 5 °C) (Temporal)   Ht 6' (1 829 m)   Wt (!) 154 kg (339 lb)   SpO2 97%   BMI 45 98 kg/m²     Physical Exam  Vitals and nursing note reviewed  Exam conducted with a chaperone present  Constitutional:       General: He is not in acute distress  Appearance: Normal appearance  He is well-developed  He is obese  He is not diaphoretic  HENT:      Head: Normocephalic and atraumatic  Right Ear: Tympanic membrane and external ear normal       Left Ear: Tympanic membrane and external ear normal       Nose: Nose normal  No congestion or rhinorrhea  Mouth/Throat:      Mouth: Mucous membranes are moist       Pharynx: Oropharynx is clear  No oropharyngeal exudate or posterior oropharyngeal erythema  Eyes:      Extraocular Movements: Extraocular movements intact  Conjunctiva/sclera: Conjunctivae normal       Pupils: Pupils are equal, round, and reactive to light  Neck:      Vascular: No carotid bruit  Cardiovascular:      Rate and Rhythm: Normal rate and regular rhythm  Heart sounds: Normal heart sounds  No murmur heard  Pulmonary:      Effort: Pulmonary effort is normal  No respiratory distress  Breath sounds: Normal breath sounds  No wheezing, rhonchi or rales  Abdominal:      General: Bowel sounds are normal  There is no distension  Palpations: Abdomen is soft  There is no mass  Tenderness: There is no abdominal tenderness  There is no guarding  Hernia: There is no hernia in the left inguinal area or right inguinal area  Genitourinary:     Penis: Normal and circumcised  Testes: Normal       Epididymis:      Right: Normal       Left: Normal    Musculoskeletal:         General: No swelling  Cervical back: Neck supple  Lymphadenopathy:      Cervical: No cervical adenopathy        Upper Body:      Right upper body: No supraclavicular, axillary, pectoral or epitrochlear adenopathy  Left upper body: No supraclavicular, axillary, pectoral or epitrochlear adenopathy  Skin:     General: Skin is warm and dry  Capillary Refill: Capillary refill takes less than 2 seconds  Neurological:      Mental Status: He is alert and oriented to person, place, and time  Mental status is at baseline  Motor: No weakness        Gait: Gait normal    Psychiatric:         Mood and Affect: Mood normal          Behavior: Behavior normal           [facility], [address] [facility]

## 2023-01-06 NOTE — ASSESSMENT & PLAN NOTE
- update baseline labs  - discussed importance of healthy diet and routine exercise with goal of weight loss  - monitor BP  - follow up 1 month

## 2023-01-06 NOTE — ASSESSMENT & PLAN NOTE
- hold off on restarting adderall until cardiac workup is completed  - pt with c/o palpitations with exercise

## 2023-01-06 NOTE — ASSESSMENT & PLAN NOTE
- complaints of palpitations while exercises   Associated SOB on exertion  - EKG normal sinus rhythm, nonspecific T wave abnormality  - fam hx of premature CAD x 2 in his mother   - pt with hypertension and HLD  - check stress echo and follow up 1 month  - discussed w/ dr Brooke Gamino

## 2023-01-19 DIAGNOSIS — R00.2 PALPITATIONS: Primary | ICD-10-CM

## 2023-01-19 DIAGNOSIS — Z82.49 FAMILY HISTORY OF PREMATURE CAD: ICD-10-CM

## 2023-01-19 DIAGNOSIS — R06.09 DOE (DYSPNEA ON EXERTION): ICD-10-CM

## 2023-01-27 ENCOUNTER — HOSPITAL ENCOUNTER (OUTPATIENT)
Dept: NON INVASIVE DIAGNOSTICS | Facility: HOSPITAL | Age: 28
Discharge: HOME | End: 2023-01-27

## 2023-01-27 ENCOUNTER — APPOINTMENT (OUTPATIENT)
Dept: LAB | Age: 28
End: 2023-01-27

## 2023-01-27 VITALS — HEIGHT: 72 IN | WEIGHT: 315 LBS | BODY MASS INDEX: 42.66 KG/M2

## 2023-01-27 DIAGNOSIS — Z13.1 SCREENING FOR DIABETES MELLITUS: ICD-10-CM

## 2023-01-27 DIAGNOSIS — E66.01 MORBID OBESITY (HCC): ICD-10-CM

## 2023-01-27 DIAGNOSIS — Z82.49 FAMILY HISTORY OF PREMATURE CAD: ICD-10-CM

## 2023-01-27 DIAGNOSIS — E55.9 VITAMIN D DEFICIENCY: ICD-10-CM

## 2023-01-27 DIAGNOSIS — R06.09 DOE (DYSPNEA ON EXERTION): ICD-10-CM

## 2023-01-27 DIAGNOSIS — Z13.0 SCREENING FOR DEFICIENCY ANEMIA: ICD-10-CM

## 2023-01-27 DIAGNOSIS — R03.0 ELEVATED BLOOD-PRESSURE READING WITHOUT DIAGNOSIS OF HYPERTENSION: ICD-10-CM

## 2023-01-27 DIAGNOSIS — E78.00 HYPERCHOLESTEROLEMIA: ICD-10-CM

## 2023-01-27 DIAGNOSIS — R00.2 PALPITATIONS: ICD-10-CM

## 2023-01-27 LAB
25(OH)D3 SERPL-MCNC: 10.5 NG/ML (ref 30–100)
ALBUMIN SERPL BCP-MCNC: 3.6 G/DL (ref 3.5–5)
ALP SERPL-CCNC: 61 U/L (ref 46–116)
ALT SERPL W P-5'-P-CCNC: 19 U/L (ref 12–78)
ANION GAP SERPL CALCULATED.3IONS-SCNC: 4 MMOL/L (ref 4–13)
AST SERPL W P-5'-P-CCNC: 17 U/L (ref 5–45)
BASOPHILS # BLD AUTO: 0.03 THOUSANDS/ÂΜL (ref 0–0.1)
BASOPHILS NFR BLD AUTO: 1 % (ref 0–1)
BILIRUB SERPL-MCNC: 0.48 MG/DL (ref 0.2–1)
BUN SERPL-MCNC: 12 MG/DL (ref 5–25)
CALCIUM SERPL-MCNC: 8.8 MG/DL (ref 8.3–10.1)
CHEST PAIN STATEMENT: NORMAL
CHLORIDE SERPL-SCNC: 106 MMOL/L (ref 96–108)
CHOLEST SERPL-MCNC: 210 MG/DL
CO2 SERPL-SCNC: 25 MMOL/L (ref 21–32)
CREAT SERPL-MCNC: 1.1 MG/DL (ref 0.6–1.3)
EOSINOPHIL # BLD AUTO: 0.12 THOUSAND/ÂΜL (ref 0–0.61)
EOSINOPHIL NFR BLD AUTO: 2 % (ref 0–6)
ERYTHROCYTE [DISTWIDTH] IN BLOOD BY AUTOMATED COUNT: 13.6 % (ref 11.6–15.1)
GFR SERPL CREATININE-BSD FRML MDRD: 92 ML/MIN/1.73SQ M
GLUCOSE P FAST SERPL-MCNC: 84 MG/DL (ref 65–99)
HCT VFR BLD AUTO: 44.2 % (ref 36.5–49.3)
HDLC SERPL-MCNC: 36 MG/DL
HGB BLD-MCNC: 14.3 G/DL (ref 12–17)
IMM GRANULOCYTES # BLD AUTO: 0.02 THOUSAND/UL (ref 0–0.2)
IMM GRANULOCYTES NFR BLD AUTO: 0 % (ref 0–2)
LDLC SERPL CALC-MCNC: 150 MG/DL (ref 0–100)
LYMPHOCYTES # BLD AUTO: 1.76 THOUSANDS/ÂΜL (ref 0.6–4.47)
LYMPHOCYTES NFR BLD AUTO: 35 % (ref 14–44)
MAX DIASTOLIC BP: 70 MMHG
MAX HEART RATE: 184 BPM
MAX HR PERCENT: 94 %
MAX HR: 184 BPM
MAX PREDICTED HEART RATE: 195 BPM
MAX. SYSTOLIC BP: 200 MMHG
MCH RBC QN AUTO: 27.2 PG (ref 26.8–34.3)
MCHC RBC AUTO-ENTMCNC: 32.4 G/DL (ref 31.4–37.4)
MCV RBC AUTO: 84 FL (ref 82–98)
MONOCYTES # BLD AUTO: 0.48 THOUSAND/ÂΜL (ref 0.17–1.22)
MONOCYTES NFR BLD AUTO: 10 % (ref 4–12)
NEUTROPHILS # BLD AUTO: 2.56 THOUSANDS/ÂΜL (ref 1.85–7.62)
NEUTS SEG NFR BLD AUTO: 52 % (ref 43–75)
NRBC BLD AUTO-RTO: 0 /100 WBCS
PLATELET # BLD AUTO: 234 THOUSANDS/UL (ref 149–390)
PMV BLD AUTO: 11.3 FL (ref 8.9–12.7)
POTASSIUM SERPL-SCNC: 4.3 MMOL/L (ref 3.5–5.3)
PROT SERPL-MCNC: 7.4 G/DL (ref 6.4–8.4)
PROTOCOL NAME: NORMAL
RATE PRESSURE PRODUCT: NORMAL
RBC # BLD AUTO: 5.26 MILLION/UL (ref 3.88–5.62)
REASON FOR TERMINATION: NORMAL
SL CV STRESS RECOVERY BP: NORMAL MMHG
SL CV STRESS RECOVERY HR: 127 BPM
SL CV STRESS RECOVERY O2 SAT: 97 %
SL CV STRESS STAGE REACHED: 3
SODIUM SERPL-SCNC: 135 MMOL/L (ref 135–147)
STRESS ANGINA INDEX: 0
STRESS BASELINE BP: NORMAL MMHG
STRESS BASELINE HR: 88 BPM
STRESS DUKE TREADMILL SCORE: 7
STRESS O2 SAT REST: 97 %
STRESS PEAK HR: 184 BPM
STRESS POST ESTIMATED WORKLOAD: 7.9 METS
STRESS POST EXERCISE DUR MIN: 6 MIN
STRESS POST EXERCISE DUR SEC: 38 SEC
STRESS POST O2 SAT PEAK: 99 %
STRESS POST PEAK BP: 200 MMHG
STRESS ST DEPRESSION: 0 MM
TARGET HR FORMULA: NORMAL
TEST INDICATION: NORMAL
TIME IN EXERCISE PHASE: NORMAL
TRIGL SERPL-MCNC: 120 MG/DL
TSH SERPL DL<=0.05 MIU/L-ACNC: 0.67 UIU/ML (ref 0.45–4.5)
WBC # BLD AUTO: 4.97 THOUSAND/UL (ref 4.31–10.16)

## 2023-02-09 ENCOUNTER — OFFICE VISIT (OUTPATIENT)
Dept: INTERNAL MEDICINE CLINIC | Facility: OTHER | Age: 28
End: 2023-02-09

## 2023-02-09 VITALS
TEMPERATURE: 98.5 F | OXYGEN SATURATION: 97 % | HEIGHT: 72 IN | SYSTOLIC BLOOD PRESSURE: 136 MMHG | HEART RATE: 82 BPM | DIASTOLIC BLOOD PRESSURE: 72 MMHG | WEIGHT: 315 LBS | BODY MASS INDEX: 42.66 KG/M2

## 2023-02-09 DIAGNOSIS — E66.01 MORBID OBESITY (HCC): ICD-10-CM

## 2023-02-09 DIAGNOSIS — E55.9 VITAMIN D DEFICIENCY: ICD-10-CM

## 2023-02-09 DIAGNOSIS — I10 PRIMARY HYPERTENSION: Primary | ICD-10-CM

## 2023-02-09 DIAGNOSIS — E78.5 HYPERLIPIDEMIA, UNSPECIFIED HYPERLIPIDEMIA TYPE: ICD-10-CM

## 2023-02-09 PROBLEM — Z00.00 ANNUAL PHYSICAL EXAM: Status: RESOLVED | Noted: 2023-01-05 | Resolved: 2023-02-09

## 2023-02-09 RX ORDER — ERGOCALCIFEROL 1.25 MG/1
50000 CAPSULE ORAL WEEKLY
Qty: 12 CAPSULE | Refills: 1 | Status: SHIPPED | OUTPATIENT
Start: 2023-02-09

## 2023-02-09 RX ORDER — LOSARTAN POTASSIUM 25 MG/1
25 TABLET ORAL DAILY
Qty: 30 TABLET | Refills: 1 | Status: SHIPPED | OUTPATIENT
Start: 2023-02-09

## 2023-02-09 NOTE — ASSESSMENT & PLAN NOTE
- Discussed lifestyle modifications including dietary changes and starting a routine exercise regimen

## 2023-02-09 NOTE — ASSESSMENT & PLAN NOTE
- Long discussion had on exercise recommendations    Recommend using the gym 4-5 times a week, alternating cardio with strength training  -Continue calorie counting  -Healthy diet high in protein  -8 pound weight loss in the last month  -Goal of 8 to 12 pound weight loss by follow-up in 6 weeks  -Referral made to weight management

## 2023-02-09 NOTE — ASSESSMENT & PLAN NOTE
- Start losartan 25 mg daily  - discussed low-sodium diet  -Encouraged exercise 30 minutes 5 times a week  -Weight loss  -Follow-up in 1 month

## 2023-02-09 NOTE — PROGRESS NOTES
Assessment/Plan:    Problem List Items Addressed This Visit        Cardiovascular and Mediastinum    Hypertension - Primary     - Start losartan 25 mg daily  - discussed low-sodium diet  -Encouraged exercise 30 minutes 5 times a week  -Weight loss  -Follow-up in 1 month         Relevant Medications    losartan (COZAAR) 25 mg tablet       Other    Hyperlipidemia     - Discussed lifestyle modifications including dietary changes and starting a routine exercise regimen         Vitamin D deficiency     - Start ergocalciferol 50,000 IU daily         Relevant Medications    ergocalciferol (VITAMIN D2) 50,000 units    Morbid obesity ([address] )     - Long discussion had on exercise recommendations  Recommend using the gym 4-5 times a week, alternating cardio with strength training  -Continue calorie counting  -Healthy diet high in protein  -8 pound weight loss in the last month  -Goal of 8 to 12 pound weight loss by follow-up in 6 weeks  -Referral made to weight management         Relevant Orders    Ambulatory Referral to Weight Management       BMI Counseling: Body mass index is 44 89 kg/m²  Discussed the patient's BMI with him  The BMI is above normal  Nutrition recommendations include 3-5 servings of fruits/vegetables daily, moderation in carbohydrate intake, increasing intake of lean protein and reducing intake of saturated fat and trans fat  Exercise recommendations include moderate aerobic physical activity for 150 minutes/week  M*Modal software was used to dictate this note  It may contain errors with dictating incorrect words or incorrect spelling  Please contact the provider directly with any questions  Subjective:      Patient ID: La Melara is a 24 y o  male  HPI    Patient presents today for 1 month follow-up chest pain, potation's and elevated blood pressure     Abs completed 1/27/2023  Vitamin D significantly low at 10 5  TSH normal 0 672  CMP within normal limits, CBC normal    Total cholesterol 210, glycerides 120, HDL 36,     He is not currently on any medications  Palpitations -history of palpitations while exercising  EKG in the office showed normal sinus rhythm with nonspecific T wave abnormality  He does have a family history of CAD x2 in his mother  He has an echo scheduled 3/13/23  His exercise stress test was completed on 1/27/2023  "1  Negative exercise stress test for symptoms of angina pectoris and negative for ECG changes of ischemia following exercise up to 94% of patient's maximal age predicted heart rate  2   Decreased exercise capacity for patient's age, limited due to shortness of breath and leg fatigue (Total exercise time: 6 minutes 38 seconds; Workload: 7 90 METS)  3   Stage II hypertension at rest   Exaggerated blood pressure response to exercise (peak blood pressure was 200/70 mmHg)  4   Exaggerated heart rate response and blunted heart rate recovery following exercise suggesting physical deconditioning  5   Unspecific resting ECG with rare isolated premature ventricular contraction with exercise  Recommend blood pressure control and aggressive risk factor modification with lifestyle changes to reduce future risk for cardiovascular disease  "    Obesity - he has adjusted his diet  He is limited portions  He has lost 8lbs in the last month  The following portions of the patient's history were reviewed and updated as appropriate: allergies, current medications, past family history, past medical history, past social history, past surgical history and problem list     Review of Systems   Constitutional: Negative for appetite change and unexpected weight change  Respiratory: Negative for cough, chest tightness and shortness of breath  Cardiovascular: Negative for chest pain and palpitations           Past Medical History:   Diagnosis Date   • ADHD          Current Outpatient Medications:   •  ergocalciferol (VITAMIN D2) 50,000 units, Take 1 capsule (50,000 Units total) by mouth once a week, Disp: 12 capsule, Rfl: 1  •  losartan (COZAAR) 25 mg tablet, Take 1 tablet (25 mg total) by mouth daily, Disp: 30 tablet, Rfl: 1    No Known Allergies    Social History   Past Surgical History:   Procedure Laterality Date   • NO PAST SURGERIES     • ROOT CANAL  11/2022     Family History   Problem Relation Age of Onset   • Asthma Mother    • Hypertension Mother    • Heart attack Mother    • No Known Problems Father    • Asthma Brother        Objective:  /72 (BP Location: Left arm, Patient Position: Sitting, Cuff Size: Large)   Pulse 82   Temp 98 5 °F (36 9 °C) (Temporal)   Ht 6' (1 829 m)   Wt (!) 150 kg (331 lb)   SpO2 97%   BMI 44 89 kg/m²      Physical Exam  Vitals reviewed  Constitutional:       General: He is not in acute distress  Appearance: Normal appearance  He is obese  He is not diaphoretic  HENT:      Head: Normocephalic and atraumatic  Eyes:      Extraocular Movements: Extraocular movements intact  Conjunctiva/sclera: Conjunctivae normal       Pupils: Pupils are equal, round, and reactive to light  Cardiovascular:      Rate and Rhythm: Normal rate and regular rhythm  Heart sounds: Normal heart sounds  No murmur heard  Pulmonary:      Effort: Pulmonary effort is normal  No respiratory distress  Breath sounds: Normal breath sounds  No wheezing, rhonchi or rales  Neurological:      Mental Status: He is alert and oriented to person, place, and time  Mental status is at baseline  Psychiatric:         Mood and Affect: Mood normal          Behavior: Behavior normal          Thought Content:  Thought content normal          Judgment: Judgment normal

## 2023-03-13 ENCOUNTER — HOSPITAL ENCOUNTER (OUTPATIENT)
Dept: NON INVASIVE DIAGNOSTICS | Facility: HOSPITAL | Age: 28
Discharge: HOME | End: 2023-03-13

## 2023-03-13 VITALS
HEART RATE: 86 BPM | WEIGHT: 315 LBS | DIASTOLIC BLOOD PRESSURE: 56 MMHG | SYSTOLIC BLOOD PRESSURE: 134 MMHG | HEIGHT: 72 IN | BODY MASS INDEX: 42.66 KG/M2

## 2023-03-13 DIAGNOSIS — R00.2 PALPITATIONS: ICD-10-CM

## 2023-03-13 DIAGNOSIS — R06.09 DOE (DYSPNEA ON EXERTION): ICD-10-CM

## 2023-03-13 DIAGNOSIS — Z82.49 FAMILY HISTORY OF PREMATURE CAD: ICD-10-CM

## 2023-03-14 LAB
AORTIC ROOT: 3.3 CM
AORTIC VALVE MEAN VELOCITY: 7.3 M/S
APICAL FOUR CHAMBER EJECTION FRACTION: 57 %
AV AREA BY CONTINUOUS VTI: 4.6 CM2
AV AREA PEAK VELOCITY: 4.3 CM2
AV LVOT MEAN GRADIENT: 1 MMHG
AV LVOT PEAK GRADIENT: 2 MMHG
AV MEAN GRADIENT: 2 MMHG
AV PEAK GRADIENT: 4 MMHG
AV VALVE AREA: 4.58 CM2
AV VELOCITY RATIO: 0.75
DOP CALC AO PEAK VEL: 1 M/S
DOP CALC AO VTI: 20.21 CM
DOP CALC LVOT AREA: 5.72 CM2
DOP CALC LVOT DIAMETER: 2.7 CM
DOP CALC LVOT PEAK VEL VTI: 16.19 CM
DOP CALC LVOT PEAK VEL: 0.75 M/S
DOP CALC LVOT STROKE INDEX: 34.5 ML/M2
DOP CALC LVOT STROKE VOLUME: 92.65 CM3
E WAVE DECELERATION TIME: 193 MS
FRACTIONAL SHORTENING: 31 % (ref 28–44)
INTERVENTRICULAR SEPTUM IN DIASTOLE (PARASTERNAL SHORT AXIS VIEW): 1.2 CM
INTERVENTRICULAR SEPTUM: 1.2 CM (ref 0.6–1.1)
LAAS-AP4: 22.4 CM2
LEFT ATRIUM SIZE: 4.4 CM
LEFT INTERNAL DIMENSION IN SYSTOLE: 3.4 CM (ref 2.1–4)
LEFT VENTRICULAR INTERNAL DIMENSION IN DIASTOLE: 4.9 CM (ref 3.5–6)
LEFT VENTRICULAR POSTERIOR WALL IN END DIASTOLE: 1.2 CM
LEFT VENTRICULAR STROKE VOLUME: 65 ML
LVSV (TEICH): 65 ML
MV E'TISSUE VEL-LAT: 18 CM/S
MV E'TISSUE VEL-SEP: 12 CM/S
MV PEAK A VEL: 0.54 M/S
MV PEAK E VEL: 72 CM/S
RIGHT ATRIAL 2D VOLUME: 61 ML
RIGHT ATRIUM AREA SYSTOLE A4C: 20.9 CM2
RIGHT VENTRICLE ID DIMENSION: 3.6 CM
SL CV LV EF: 58
SL CV PED ECHO LEFT VENTRICLE DIASTOLIC VOLUME (MOD BIPLANE) 2D: 112 ML
SL CV PED ECHO LEFT VENTRICLE SYSTOLIC VOLUME (MOD BIPLANE) 2D: 47 ML
TRICUSPID ANNULAR PLANE SYSTOLIC EXCURSION: 1.9 CM

## 2023-03-23 ENCOUNTER — OFFICE VISIT (OUTPATIENT)
Dept: INTERNAL MEDICINE CLINIC | Facility: OTHER | Age: 28
End: 2023-03-23

## 2023-03-23 VITALS
DIASTOLIC BLOOD PRESSURE: 84 MMHG | HEART RATE: 80 BPM | OXYGEN SATURATION: 98 % | TEMPERATURE: 97.4 F | SYSTOLIC BLOOD PRESSURE: 132 MMHG | HEIGHT: 72 IN | WEIGHT: 315 LBS | BODY MASS INDEX: 42.66 KG/M2

## 2023-03-23 DIAGNOSIS — E78.5 HYPERLIPIDEMIA, UNSPECIFIED HYPERLIPIDEMIA TYPE: ICD-10-CM

## 2023-03-23 DIAGNOSIS — E66.01 MORBID OBESITY (HCC): ICD-10-CM

## 2023-03-23 DIAGNOSIS — R41.840 ATTENTION DEFICIT: ICD-10-CM

## 2023-03-23 DIAGNOSIS — I10 PRIMARY HYPERTENSION: Primary | ICD-10-CM

## 2023-03-23 DIAGNOSIS — E55.9 VITAMIN D DEFICIENCY: ICD-10-CM

## 2023-03-23 PROBLEM — R03.0 ELEVATED BP WITHOUT DIAGNOSIS OF HYPERTENSION: Status: RESOLVED | Noted: 2019-10-03 | Resolved: 2023-03-23

## 2023-03-23 RX ORDER — IBUPROFEN 600 MG/1
TABLET ORAL
COMMUNITY
Start: 2023-02-19

## 2023-03-23 RX ORDER — BUPROPION HYDROCHLORIDE 150 MG/1
150 TABLET ORAL EVERY MORNING
Qty: 30 TABLET | Refills: 5 | Status: SHIPPED | OUTPATIENT
Start: 2023-03-23 | End: 2023-09-19

## 2023-03-23 RX ORDER — LOSARTAN POTASSIUM 50 MG/1
50 TABLET ORAL DAILY
Qty: 90 TABLET | Refills: 1 | Status: SHIPPED | OUTPATIENT
Start: 2023-03-23

## 2023-03-23 NOTE — PATIENT INSTRUCTIONS
Monitor BP daily   Notify me if BP is consistently < 110/60 and then we would cut the dose down to 25 mg again         Lower Back Exercises   AMBULATORY CARE:   Lower back exercises  help heal and strengthen your back muscles to prevent another injury  Ask your healthcare provider if you need to see a physical therapist for more advanced exercises  Seek care immediately if:   You have severe pain that prevents you from moving  Call your doctor if:   Your pain becomes worse  You have new pain  You have questions or concerns about your condition or care  Do lower back exercises safely:   Do the exercises on a mat or firm surface (not on a bed)  A firm surface will support your spine and prevent low back pain  Move slowly and smoothly  Avoid fast or jerky motions  Breathe normally  Do not hold your breath  Stop if you feel pain  It is normal to feel some discomfort at first, but you should not feel pain  Regular exercise will help decrease your discomfort over time  Lower back exercises: Your healthcare provider may recommend that you do back exercises 10 to 30 minutes each day  He or she may also recommend that you do exercises 1 to 3 times each day  Ask your provider which exercises are best for you and how often to do them  Ankle pumps:  Lie on your back  Move your foot up (with your toes pointing toward your head)  Then, move your foot down (with your toes pointing away from you)  Repeat this exercise 10 times on each side  Heel slides:  Lie on your back  Slowly bend one leg and then straighten it  Next, bend the other leg and then straighten it  Repeat 10 times on each side  Pelvic tilt:  Lie on your back with your knees bent and feet flat on the floor  Place your arms in a relaxed position beside your body  Tighten the muscles of your abdomen and flatten your back against the floor  Hold for 5 seconds  Repeat 5 times           Back stretch:  Lie on your back with your hands behind your head  Bend your knees and turn the lower half of your body to one side  Hold this position for 10 seconds  Repeat 3 times on each side  Straight leg raises:  Lie on your back with one leg straight  Bend the other knee  Tighten your abdomen and then slowly lift the straight leg up about 6 to 12 inches off the floor  Hold for 1 to 5 seconds  Lower your leg slowly  Repeat 10 times on each leg  Knee-to-chest:  Lie on your back with your knees bent and feet flat on the floor  Pull one of your knees toward your chest and hold it there for 5 seconds  Return your leg to the starting position  Lift the other knee toward your chest and hold for 5 seconds  Do this 5 times on each side  Cat and camel:  Place your hands and knees on the floor  Arch your back upward toward the ceiling and lower your head  Round out your spine as much as you can  Hold for 5 seconds  Lift your head upward and push your chest downward toward the floor  Hold for 5 seconds  Do 3 sets or as directed  Wall squats:  Stand with your back against a wall  Tighten the muscles of your abdomen  Slowly lower your body until your knees are bent at a 45 degree angle  Hold this position for 5 seconds  Slowly move back up to a standing position  Repeat 10 times  Curl up:  Lie on your back with your knees bent and feet flat on the floor  Place your hands, palms down, underneath the curve in your lower back  Next, with your elbows on the floor, lift your shoulders and chest 2 to 3 inches  Keep your head in line with your shoulders  Hold this position for 5 seconds  When you can do this exercise without pain for 10 to 15 seconds, you may add a rotation  While your shoulders and chest are lifted off the ground, turn slightly to the left and hold  Repeat on the other side  Bird dog:  Place your hands and knees on the floor   Keep your wrists directly below your shoulders and your knees directly below your hips  Pull your belly button in toward your spine  Do not flatten or arch your back  Tighten your abdominal muscles  Raise one arm straight out so that it is aligned with your head  Next, raise the leg opposite your arm  Hold this position for 15 seconds  Lower your arm and leg slowly and change sides  Do 5 sets  Follow up with your doctor as directed:  Write down your questions so you remember to ask them during your visits  © Copyright [employer] 2022 Information is for End User's use only and may not be sold, redistributed or otherwise used for commercial purposes  The above information is an  only  It is not intended as medical advice for individual conditions or treatments  Talk to your doctor, nurse or pharmacist before following any medical regimen to see if it is safe and effective for you

## 2023-03-23 NOTE — ASSESSMENT & PLAN NOTE
- Patient has lost 17 pounds in the last 2 months through dietary modifications  -Continue healthy diet  -Start routine exercise  -Follow-up in 4 months

## 2023-03-23 NOTE — PROGRESS NOTES
Assessment/Plan:    Problem List Items Addressed This Visit        Cardiovascular and Mediastinum    Hypertension - Primary     - Increase losartan to 50 mg daily  -Continue healthy diet and routine exercise  -Continue weight loss  -Monitor blood pressure daily, parameters given of when to call office  -Follow-up in 4 months         Relevant Medications    losartan (COZAAR) 50 mg tablet    Other Relevant Orders    Basic metabolic panel       Other    Attention deficit     - We will hold off on restarting Adderall given that this is a stimulant and he has hypertension  -We will try him on Wellbutrin  mg daily  -Previously he had no success on Strattera so we will avoid this as well  -plan To follow-up in 4 months, sooner as needed         Relevant Medications    buPROPion (WELLBUTRIN XL) 150 mg 24 hr tablet    Hyperlipidemia     - Continue healthy diet and routine exercise  - repeat lipid panel in 4 months         Relevant Orders    Lipid Panel with Direct LDL reflex    Basic metabolic panel    Vitamin D deficiency     - Continue ergocalciferol 50,000 IU weekly  -Repeat in 4 months         Relevant Orders    Vitamin D 25 hydroxy    Morbid obesity ([address] )     - Patient has lost 17 pounds in the last 2 months through dietary modifications  -Continue healthy diet  -Start routine exercise  -Follow-up in 4 months            M*Modal software was used to dictate this note  It may contain errors with dictating incorrect words or incorrect spelling  Please contact the provider directly with any questions  Subjective:      Patient ID: Shanel Chang is a 24 y o  male  HPI     Patient presents today for 1 month follow up HTN and morbid obesity  He is working on weight loss through diet and exercise  He was started on losartan 25mg daily for HTN    Echo completed 3/13   "•  Left Ventricle: Left ventricular cavity size is normal  Wall thickness is mildly increased  There is mild concentric hypertrophy   The left ventricular ejection fraction is 58% by biplane measurement  Systolic function is normal  Wall motion is normal  Diastolic function is normal   Left atrial filling pressure is normal   •  Left Atrium: The atrium is mildly dilated  •  Right Atrium: The atrium is mildly dilated "    HTN - currently on losartan 25mg daily  Obesity - he has lost another 9 lbs in the last month  He is down 17 lbs since starting his diet  He is counting calories  He is working on portion control  He did injure his back after the last visit and hasn't been back to the gym  Pain is in the right mid/lower back  He feels it with certain movements but it is improving  He is not taking any medication for it  He was using ibuprofen and muscle relaxer in the beginning but now doesn't need anything  He is interested in going back on the medication for his ADHD  He was previously on Adderall prior to that he was on Strattera but Strattera he states did not work well for him  The following portions of the patient's history were reviewed and updated as appropriate: allergies, current medications, past family history, past medical history, past social history, past surgical history and problem list     Review of Systems   Constitutional: Negative for activity change, appetite change and unexpected weight change  Respiratory: Negative for chest tightness, shortness of breath and wheezing  Cardiovascular: Negative for chest pain and palpitations  Psychiatric/Behavioral: Positive for decreased concentration           Past Medical History:   Diagnosis Date   • ADHD          Current Outpatient Medications:   •  buPROPion (WELLBUTRIN XL) 150 mg 24 hr tablet, Take 1 tablet (150 mg total) by mouth every morning, Disp: 30 tablet, Rfl: 5  •  ergocalciferol (VITAMIN D2) 50,000 units, Take 1 capsule (50,000 Units total) by mouth once a week, Disp: 12 capsule, Rfl: 1  •  ibuprofen (MOTRIN) 600 mg tablet, TAKE ONE TABLET BY MOUTH EVERY 6 HOURS AS NEEDED FOR MILD PAIN, Disp: , Rfl:   •  losartan (COZAAR) 50 mg tablet, Take 1 tablet (50 mg total) by mouth daily, Disp: 90 tablet, Rfl: 1    No Known Allergies    Social History   Past Surgical History:   Procedure Laterality Date   • NO PAST SURGERIES     • ROOT CANAL  11/2022     Family History   Problem Relation Age of Onset   • Asthma Mother    • Hypertension Mother    • Heart attack Mother    • No Known Problems Father    • Asthma Brother        Objective:  /84 (BP Location: Left arm, Patient Position: Sitting, Cuff Size: Large)   Pulse 80   Temp (!) 97 4 °F (36 3 °C) (Temporal)   Ht 6' (1 829 m)   Wt (!) 146 kg (322 lb)   SpO2 98%   BMI 43 67 kg/m²      Physical Exam  Vitals reviewed  Constitutional:       General: He is not in acute distress  Appearance: Normal appearance  He is obese  He is not diaphoretic  HENT:      Head: Normocephalic and atraumatic  Eyes:      Extraocular Movements: Extraocular movements intact  Conjunctiva/sclera: Conjunctivae normal       Pupils: Pupils are equal, round, and reactive to light  Cardiovascular:      Rate and Rhythm: Normal rate and regular rhythm  Heart sounds: Normal heart sounds  No murmur heard  Pulmonary:      Effort: Pulmonary effort is normal  No respiratory distress  Breath sounds: Normal breath sounds  No wheezing, rhonchi or rales  Neurological:      Mental Status: He is alert and oriented to person, place, and time  Mental status is at baseline  Psychiatric:         Mood and Affect: Mood normal          Behavior: Behavior normal          Thought Content:  Thought content normal          Judgment: Judgment normal

## 2023-03-23 NOTE — ASSESSMENT & PLAN NOTE
- We will hold off on restarting Adderall given that this is a stimulant and he has hypertension  -We will try him on Wellbutrin  mg daily  -Previously he had no success on Strattera so we will avoid this as well  -plan To follow-up in 4 months, sooner as needed

## 2023-03-23 NOTE — ASSESSMENT & PLAN NOTE
- Increase losartan to 50 mg daily  -Continue healthy diet and routine exercise  -Continue weight loss  -Monitor blood pressure daily, parameters given of when to call office  -Follow-up in 4 months

## 2023-04-27 ENCOUNTER — OFFICE VISIT (OUTPATIENT)
Dept: BARIATRICS | Facility: CLINIC | Age: 28
End: 2023-04-27

## 2023-04-27 VITALS
SYSTOLIC BLOOD PRESSURE: 135 MMHG | DIASTOLIC BLOOD PRESSURE: 80 MMHG | HEIGHT: 73 IN | WEIGHT: 315 LBS | HEART RATE: 99 BPM | BODY MASS INDEX: 41.75 KG/M2

## 2023-04-27 DIAGNOSIS — R41.840 ATTENTION DEFICIT: ICD-10-CM

## 2023-04-27 DIAGNOSIS — Z91.89 AT RISK FOR SLEEP APNEA: ICD-10-CM

## 2023-04-27 DIAGNOSIS — I10 PRIMARY HYPERTENSION: ICD-10-CM

## 2023-04-27 DIAGNOSIS — E66.01 MORBID OBESITY (HCC): Primary | ICD-10-CM

## 2023-04-27 RX ORDER — NALTREXONE HYDROCHLORIDE 50 MG/1
50 TABLET, FILM COATED ORAL DAILY
Qty: 30 TABLET | Refills: 2 | Status: SHIPPED | OUTPATIENT
Start: 2023-04-27

## 2023-04-27 NOTE — PROGRESS NOTES
Assessment/Plan: Morbid obesity ([facility] [address] )  - Discussed options of Healthy[facility]-Intensive Lifestyle Intervention Program, Very Low Calorie Diet-VLCD, Conservative Program, Demian-En-Y Gastric Bypass and Vertical Sleeve Gastrectomy and the role of weight loss medications   -Not interested in weight loss surgery  - Already on Wellbutrin  mg daily through primary care  - Avoid sympathomimetics, as BP was increased on stimulants in the past    - Denies history of seizures or glaucoma  - Struggling with cravings and is interested in medication to help with that  Discussed off label use of naltrexone, adding that to Wellbutrin to mimic the weight loss medication Contrave  He was agreeable to that   -Naltrexone started April 27, 2023 at weight of 318 pounds  Start naltrexone 50 mg tablet, take 1/2 tablet daily for 1 month and then increase to 1 tablet daily   - Side effects of naltrexone discussed: nausea, vomiting, diarrhea, constipation, headache, anxiety, and confusion  Advised not to consume alcohol with naltrexone  Must be discontinued prior to surgery  -Medication contract signed April 27, 2023   - Patient denies personal history of pancreatitis  Patient also denies personal and family history of thyroid cancer and multiple endocrine neoplasia type 2 (MEN 2 tumor)  - He will check coverage of FDA approved weight loss medications and update me next office visit  - Reviewed the importance of lifestyle changes along with weight loss medications  - Patient is interested in pursuing Conservative Program  - Initial weight loss goal of 5-10% weight loss for improved health  - Weight loss can improve patient's co-morbid conditions and/or prevent weight-related complications  - Kenroy Sapp 5/8  - Labs reviewed: TSH, lipid panel, CMP, and CBC completed January 27, 2023  Cholesterol and LDL elevated, HDL low, which will likely improve with weight loss    Remainder of the blood work was within acceptable range   -Check A1c and fasting insulin  Goals:  Do not skip meals  Food log (ie ) www myfitnesspal com,sparkpeople  com,loseit com,calorieking  com,etc  baritastic (use skinnytaste  com, dietdoctor  com or smartphone [employer] for recipes)  No sugary beverages  At least 64oz of water daily  Increase physical activity by 10 minutes daily  Gradually increase physical activity to a goal of 5 days per week for 30 minutes of MODERATE intensity PLUS 2 days per week of FULL BODY resistance training (use smartphone apps [employer], Home Workout, etc )  Continue food logging, weighing, and measuring food  2200 annie/day  Sample menu given  Keep up the great work with water intake, at least 64 ounces daily  Exercise discussed  Recently restarted going to the gym  Start 2 days/week for 10 minutes of cardiovascular exercise and gradually increase to a goal of 5 days/week for 30 minutes and 2 days resistance training  Continue Wellbutrin through primary care  Start naltrexone  At risk for sleep apnea  - Stop bang 5/8  - Risks of untreated sleep apnea reviewed, including increased risk for myocardial infarction and stroke, increased difficulty with weight loss, and risk of sudden cardiac death due to arrhythmia   - Referral to sleep medicine placed  Hypertension  - Taking losartan  May improve with weight loss and lifestyle modification  Continue management with prescribing provider  Attention deficit  - Taking Wellbutrin  Continue management with prescribing provider  Yumi was seen today for consult  Diagnoses and all orders for this visit:    Morbid obesity ([address] )  -     Ambulatory Referral to Weight Management  -     Insulin, fasting; Future  -     HEMOGLOBIN A1C W/ EAG ESTIMATION; Future  -     naltrexone (REVIA) 50 mg tablet; Take 1 tablet (50 mg total) by mouth daily    At risk for sleep apnea  -     Ambulatory referral to Sleep Medicine;  Future    Primary hypertension    Attention deficit        Total time spent: 40 min, with >50% face-to-face time spent counseling patient on nonsurgical interventions for the treatment of excess weight  Discussed in detail nonsurgical options including intensive lifestyle intervention program, very low-calorie diet program and conservative program   Discussed the role of weight loss medications  Counseled patient on diet behavior and exercise modification for weight loss  Follow up in approximately 3 months with Non-Surgical Physician/Advanced Practitioner  Subjective:   Chief Complaint   Patient presents with   • Consult     MWM consult; Goal Wt-220lb; Waist 53 4in; Stop bang-5/8       Patient ID: Hali Restrepo  is a 24 y o  male with excess weight/obesity here to pursue weight management  Previous notes and records have been reviewed  Past Medical History:   Diagnosis Date   • ADHD      Past Surgical History:   Procedure Laterality Date   • NO PAST SURGERIES     • ROOT CANAL  11/2022       HPI:  Wt Readings from Last 20 Encounters:   04/27/23 (!) 144 kg (318 lb)   03/23/23 (!) 146 kg (322 lb)   03/13/23 (!) 150 kg (331 lb)   02/09/23 (!) 150 kg (331 lb)   01/27/23 (!) 154 kg (339 lb)   01/05/23 (!) 154 kg (339 lb)   06/30/20 (!) 149 kg (328 lb 3 2 oz)   03/13/20 (!) 147 kg (324 lb)   10/03/19 (!) 140 kg (308 lb)   07/29/19 (!) 139 kg (306 lb)   06/28/19 (!) 142 kg (312 lb 12 8 oz)   03/09/16 131 kg (288 lb) (>99 %, Z= 2 91)*   12/23/15 126 kg (277 lb) (>99 %, Z= 2 80)*   11/25/15 122 kg (268 lb 8 oz) (>99 %, Z= 2 70)*     * Growth percentiles are based on CDC (Boys, 2-20 Years) data  Obesity/Excess Weight:  Severity: Severe  Onset:  Whole life    Modifiers: Diet and Exercise and keto diet   Contributing factors: Poor Food Choices, Insufficient Physical Activity and Stress/Emotional Eating  Associated symptoms: comorbid conditions     Taking Wellbutrin  mg daily through primary care for ADHD  No negative side effects   No difference "with appetite  Previously on stimulant and it increased blood pressure  Struggling with appetite and cravings  Has been logging his food and struggles to get closer to 2200 annie/day  Typically he is around 2500 annie/day  Hydration: 6 bottles of water, 1 cup coffee with SF creamer  Alcohol: none  Smoking: denies  Exercise: recently restarted gym  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  Sleep: 7-9 hours  STOP ban/8    Highest weight: 340 lbs beginning   Current weight: 318 lbs BMI 41 96  Goal weight: 220 lbs     Colonoscopy: N/A      The following portions of the patient's history were reviewed and updated as appropriate: allergies, current medications, past family history, past medical history, past social history, past surgical history, and problem list     Family History   Problem Relation Age of Onset   • Asthma Mother    • Hypertension Mother    • Heart attack Mother    • No Known Problems Father    • Asthma Brother    • Cancer Neg Hx    • Diabetes Neg Hx    • Stroke Neg Hx    • Thyroid disease Neg Hx         Review of Systems   HENT: Negative for sore throat  Respiratory: Negative for cough and shortness of breath  Cardiovascular: Negative for chest pain and palpitations  Gastrointestinal: Negative for constipation, diarrhea, nausea and vomiting         + GERD controlled with PRN medication   Endocrine: Negative for cold intolerance and heat intolerance  Genitourinary: Negative for dysuria  Musculoskeletal: Negative for arthralgias and back pain  Skin: Negative for rash  Neurological: Negative for headaches  Psychiatric/Behavioral: Negative for suicidal ideas (or HI)  Denies depression and anxiety       Objective:  /80   Pulse 99   Ht 6' 1\" (1 854 m)   Wt (!) 144 kg (318 lb)   BMI 41 96 kg/m²     Physical Exam  Vitals and nursing note reviewed  Constitutional   General appearance: Abnormal   well developed and morbidly obese     Eyes No " conjunctival injection  Ears, Nose, Mouth, and Throat Oral mucosa moist    Pulmonary   Respiratory effort: No increased work of breathing or signs of respiratory distress  Cardiovascular    Examination of extremities for edema and/or varicosities: Normal   no edema  Abdomen   Abdomen: Abnormal   The abdomen was obese      Musculoskeletal   Normal range of motion  Neurological   Gait and station: Normal    Psychiatric   Orientation to person, place and time: Normal     Affect: appropriate None

## 2023-04-27 NOTE — ASSESSMENT & PLAN NOTE
- Discussed options of HealthyCORE-Intensive Lifestyle Intervention Program, Very Low Calorie Diet-VLCD, Conservative Program, Demian-En-Y Gastric Bypass and Vertical Sleeve Gastrectomy and the role of weight loss medications   -Not interested in weight loss surgery  - Already on Wellbutrin  mg daily through primary care  - Avoid sympathomimetics, as BP was increased on stimulants in the past    - Denies history of seizures or glaucoma  - Struggling with cravings and is interested in medication to help with that  Discussed off label use of naltrexone, adding that to Wellbutrin to mimic the weight loss medication Contrave  He was agreeable to that   -Naltrexone started April 27, 2023 at weight of 318 pounds  Start naltrexone 50 mg tablet, take 1/2 tablet daily for 1 month and then increase to 1 tablet daily   - Side effects of naltrexone discussed: nausea, vomiting, diarrhea, constipation, headache, anxiety, and confusion  Advised not to consume alcohol with naltrexone  Must be discontinued prior to surgery  -Medication contract signed April 27, 2023   - Patient denies personal history of pancreatitis  Patient also denies personal and family history of thyroid cancer and multiple endocrine neoplasia type 2 (MEN 2 tumor)  - He will check coverage of FDA approved weight loss medications and update me next office visit  - Reviewed the importance of lifestyle changes along with weight loss medications  - Patient is interested in pursuing Conservative Program  - Initial weight loss goal of 5-10% weight loss for improved health  - Weight loss can improve patient's co-morbid conditions and/or prevent weight-related complications  - Kenroy Dumont 5/8  - Labs reviewed: TSH, lipid panel, CMP, and CBC completed January 27, 2023  Cholesterol and LDL elevated, HDL low, which will likely improve with weight loss  Remainder of the blood work was within acceptable range   -Check A1c and fasting insulin      Goals:  Do not skip meals  Food log (ie ) www myfitnesspal com,sparkpeople  com,loseit com,calorieking  com,etc  baritastic (use skinnytaste  com, dietdoctor  com or smartphone padmaja [employer] for recipes)  No sugary beverages  At least 64oz of water daily  Increase physical activity by 10 minutes daily  Gradually increase physical activity to a goal of 5 days per week for 30 minutes of MODERATE intensity PLUS 2 days per week of FULL BODY resistance training (use smartphone apps [employer], Home Workout, etc )  Continue food logging, weighing, and measuring food  2200 annie/day  Sample menu given  Keep up the great work with water intake, at least 64 ounces daily  Exercise discussed  Recently restarted going to the gym  Start 2 days/week for 10 minutes of cardiovascular exercise and gradually increase to a goal of 5 days/week for 30 minutes and 2 days resistance training  Continue Wellbutrin through primary care  Start naltrexone

## 2023-04-27 NOTE — ASSESSMENT & PLAN NOTE
- Stop bang 5/8  - Risks of untreated sleep apnea reviewed, including increased risk for myocardial infarction and stroke, increased difficulty with weight loss, and risk of sudden cardiac death due to arrhythmia   - Referral to sleep medicine placed

## 2023-04-27 NOTE — PATIENT INSTRUCTIONS
Start Naltrexone 50 mg tablet, take 1/2 tablet daily for one month, then increase to 1 tab daily  Side effects of naltrexone: nausea, vomiting, diarrhea, constipation, headache, anxiety, and confusion  Advised not to consume alcohol with naltrexone  Must be discontinued prior to surgery

## 2023-05-12 ENCOUNTER — TELEPHONE (OUTPATIENT)
Dept: BARIATRICS | Facility: CLINIC | Age: 28
End: 2023-05-12

## 2023-05-12 NOTE — TELEPHONE ENCOUNTER
----- Message from [address] sent at 5/11/2023  5:46 PM EDT -----  Regarding: Naltrexone  Contact: [phone] hi  Both [facility] and [facility] are covered by my insurance  But would be preferred to be in a 90 day supply and sent to a [facility] pharmacy instead of my usual pharmacy

## 2023-05-12 NOTE — TELEPHONE ENCOUNTER
Spoke with Yumi regarding [facility] and [facility]  He is aware of the possible [facility] shortage, but would like to go ahead and start [facility]  He will obtain the needed blood work first      Patient denies personal history of pancreatitis  Patient also denies personal and family history of thyroid cancer and multiple endocrine neoplasia type 2 (MEN 2 tumor)  Side effects of Wegovy discussed: nausea, vomiting, diarrhea, and constipation  If severe abdominal pain develops, stop Wegovy and go to the ER, as this could be pancreatitis  Monitor heart rate while on Wegovy and if resting heart rate greater than 100 beats per minute, patient will notify me

## 2023-05-18 DIAGNOSIS — I10 PRIMARY HYPERTENSION: ICD-10-CM

## 2023-05-18 DIAGNOSIS — R41.840 ATTENTION DEFICIT: ICD-10-CM

## 2023-05-18 RX ORDER — LOSARTAN POTASSIUM 50 MG/1
50 TABLET ORAL DAILY
Qty: 90 TABLET | Refills: 1 | Status: SHIPPED | OUTPATIENT
Start: 2023-05-18

## 2023-05-18 RX ORDER — BUPROPION HYDROCHLORIDE 150 MG/1
150 TABLET ORAL EVERY MORNING
Qty: 90 TABLET | Refills: 1 | Status: SHIPPED | OUTPATIENT
Start: 2023-05-18 | End: 2023-11-14

## 2023-07-27 ENCOUNTER — OFFICE VISIT (OUTPATIENT)
Dept: INTERNAL MEDICINE CLINIC | Facility: OTHER | Age: 28
End: 2023-07-27
Payer: COMMERCIAL

## 2023-07-27 VITALS
HEIGHT: 73 IN | DIASTOLIC BLOOD PRESSURE: 86 MMHG | SYSTOLIC BLOOD PRESSURE: 120 MMHG | WEIGHT: 307 LBS | TEMPERATURE: 97.5 F | BODY MASS INDEX: 40.69 KG/M2 | OXYGEN SATURATION: 96 % | HEART RATE: 97 BPM

## 2023-07-27 DIAGNOSIS — I10 PRIMARY HYPERTENSION: ICD-10-CM

## 2023-07-27 DIAGNOSIS — E66.01 MORBID OBESITY (HCC): ICD-10-CM

## 2023-07-27 DIAGNOSIS — E55.9 VITAMIN D DEFICIENCY: ICD-10-CM

## 2023-07-27 DIAGNOSIS — R41.840 ATTENTION DEFICIT: Primary | ICD-10-CM

## 2023-07-27 PROCEDURE — 99214 OFFICE O/P EST MOD 30 MIN: CPT

## 2023-07-27 RX ORDER — BUPROPION HYDROCHLORIDE 150 MG/1
150 TABLET ORAL EVERY MORNING
Qty: 90 TABLET | Refills: 1 | Status: SHIPPED | OUTPATIENT
Start: 2023-07-27 | End: 2024-01-23

## 2023-07-27 RX ORDER — LOSARTAN POTASSIUM 50 MG/1
50 TABLET ORAL DAILY
Qty: 90 TABLET | Refills: 1 | Status: SHIPPED | OUTPATIENT
Start: 2023-07-27

## 2023-07-27 NOTE — ASSESSMENT & PLAN NOTE
Encourage continue efforts in losing weight as patient has already lost 15 pounds.   He has a follow-up with weight management sometime within the next month  Follow-up in 4 months with Erum Izquierdo for weight check as well as lab discussion

## 2023-07-27 NOTE — PROGRESS NOTES
Name: Lisa Loredo      : 1995      MRN: [id number]  Encounter Provider: Ailyn Minor DO  Encounter Date: 2023   Encounter department: [address]     1. Attention deficit  Assessment & Plan:  Previously on Adderall 20 mg daily  We will restart this medication as patient is dealing with symptoms of attention deficit disorder. Adderall 20 mg extended release daily sent to the pharmacy today    Orders:  -     buPROPion (WELLBUTRIN XL) 150 mg 24 hr tablet; Take 1 tablet (150 mg total) by mouth every morning    2. Primary hypertension  Assessment & Plan:  Chronic, controlled  Blood pressure in the office today was 120/86  Refill of 50 mg losartan sent to the pharmacy    Orders:  -     losartan (COZAAR) 50 mg tablet; Take 1 tablet (50 mg total) by mouth daily    3. Morbid obesity ([address])  Assessment & Plan:  Encourage continue efforts in losing weight as patient has already lost 15 pounds. He has a follow-up with weight management sometime within the next month  Follow-up in 4 months with Leonid Jensen for weight check as well as lab discussion      4. Vitamin D deficiency  Assessment & Plan: We will recheck on upcoming labs           Subjective      HPI   82-year-old male with past medical history of attention deficit disorder, hypertension, obesity presents today for 4-month follow-up for lab discussion. He has forgotten to get his labs drawn prior to this visit however previous labs are significant for hyperlipidemia as well as vitamin D deficiency. He has been taking vitamin D supplementation and repeat labs are ordered for him to obtain fasting. He also wants to discuss restarting Adderall 20 mg daily. He states that this was a medication he was on previously in  however he discontinued it since schooling was transition to online and he did not find himself as distracted.   He has since obtained a job and has been working at home and his find his attention has been worse. It is causing him difficulty completing tasks at home. He was trialed on Wellbutrin 150 mg daily by Beth Goyal and he stated initially that this helped however he does not feel it is helping anymore. He has also been attempting to lose weight. He has lost close to 20 pounds if not more since working on diet lifestyle changes. He does not have many exercise hours a week and I encouraged him to slowly add some back in and encouraged him to continue the good work with his diet. Review of Systems   Constitutional: Negative for chills and fever. HENT: Negative for sore throat. Respiratory: Negative for cough and shortness of breath. Cardiovascular: Negative for chest pain and palpitations. Gastrointestinal: Negative for abdominal pain, blood in stool, constipation, diarrhea, nausea and vomiting. Genitourinary: Negative for dysuria and hematuria. Musculoskeletal: Negative for arthralgias and back pain. Skin: Negative for color change and rash. Neurological: Negative for syncope and headaches. Psychiatric/Behavioral: Negative for agitation and behavioral problems. All other systems reviewed and are negative.       Current Outpatient Medications on File Prior to Visit   Medication Sig   • ergocalciferol (VITAMIN D2) 50,000 units Take 1 capsule (50,000 Units total) by mouth once a week   • ibuprofen (MOTRIN) 600 mg tablet TAKE ONE TABLET BY MOUTH EVERY 6 HOURS AS NEEDED FOR MILD PAIN   • [DISCONTINUED] buPROPion (WELLBUTRIN XL) 150 mg 24 hr tablet Take 1 tablet (150 mg total) by mouth every morning   • [DISCONTINUED] losartan (COZAAR) 50 mg tablet Take 1 tablet (50 mg total) by mouth daily       Objective     /86 (BP Location: Left arm, Patient Position: Sitting, Cuff Size: Adult)   Pulse 97   Temp 97.5 °F (36.4 °C) (Temporal)   Ht 6' 1" (1.854 m)   Wt (!) 139 kg (307 lb)   SpO2 96% Comment: ra  BMI 40.50 kg/m²     Physical Exam  Vitals reviewed. Constitutional:       General: He is not in acute distress. Appearance: Normal appearance. He is normal weight. He is not ill-appearing. HENT:      Head: Normocephalic and atraumatic. Mouth/Throat:      Mouth: Mucous membranes are moist.      Pharynx: Oropharynx is clear. Eyes:      Conjunctiva/sclera: Conjunctivae normal.   Cardiovascular:      Rate and Rhythm: Normal rate and regular rhythm. Heart sounds: No murmur heard. No friction rub. No gallop. Pulmonary:      Effort: Pulmonary effort is normal. No respiratory distress. Breath sounds: Normal breath sounds. No wheezing, rhonchi or rales. Abdominal:      General: Abdomen is flat. Bowel sounds are normal.      Palpations: Abdomen is soft. Musculoskeletal:      Cervical back: Normal range of motion. Skin:     General: Skin is warm and dry. Neurological:      General: No focal deficit present. Mental Status: He is alert.    Psychiatric:         Mood and Affect: Mood normal.         Behavior: Behavior normal.       Delta Chafe Yext, DO

## 2023-07-27 NOTE — ASSESSMENT & PLAN NOTE
Previously on Adderall 20 mg daily  We will restart this medication as patient is dealing with symptoms of attention deficit disorder.   Adderall 20 mg extended release daily sent to the pharmacy today

## 2023-07-27 NOTE — ASSESSMENT & PLAN NOTE
Chronic, controlled  Blood pressure in the office today was 120/86  Refill of 50 mg losartan sent to the pharmacy

## 2023-07-28 ENCOUNTER — APPOINTMENT (OUTPATIENT)
Dept: LAB | Age: 28
End: 2023-07-28
Payer: COMMERCIAL

## 2023-07-28 DIAGNOSIS — E66.01 MORBID OBESITY (HCC): ICD-10-CM

## 2023-07-28 DIAGNOSIS — E55.9 VITAMIN D DEFICIENCY: ICD-10-CM

## 2023-07-28 DIAGNOSIS — E78.5 HYPERLIPIDEMIA, UNSPECIFIED HYPERLIPIDEMIA TYPE: ICD-10-CM

## 2023-07-28 DIAGNOSIS — I10 PRIMARY HYPERTENSION: ICD-10-CM

## 2023-07-28 LAB
25(OH)D3 SERPL-MCNC: 28.8 NG/ML (ref 30–100)
ANION GAP SERPL CALCULATED.3IONS-SCNC: 6 MMOL/L
BUN SERPL-MCNC: 11 MG/DL (ref 5–25)
CALCIUM SERPL-MCNC: 8.7 MG/DL (ref 8.3–10.1)
CHLORIDE SERPL-SCNC: 107 MMOL/L (ref 96–108)
CHOLEST SERPL-MCNC: 200 MG/DL
CO2 SERPL-SCNC: 24 MMOL/L (ref 21–32)
CREAT SERPL-MCNC: 1.37 MG/DL (ref 0.6–1.3)
EST. AVERAGE GLUCOSE BLD GHB EST-MCNC: 108 MG/DL
GFR SERPL CREATININE-BSD FRML MDRD: 71 ML/MIN/1.73SQ M
GLUCOSE P FAST SERPL-MCNC: 89 MG/DL (ref 65–99)
HBA1C MFR BLD: 5.4 %
HDLC SERPL-MCNC: 37 MG/DL
INSULIN SERPL-ACNC: 19.51 UIU/ML (ref 1.9–23)
LDLC SERPL CALC-MCNC: 146 MG/DL (ref 0–100)
POTASSIUM SERPL-SCNC: 4.1 MMOL/L (ref 3.5–5.3)
SODIUM SERPL-SCNC: 137 MMOL/L (ref 135–147)
TRIGL SERPL-MCNC: 85 MG/DL

## 2023-07-28 PROCEDURE — 83036 HEMOGLOBIN GLYCOSYLATED A1C: CPT

## 2023-07-28 PROCEDURE — 80048 BASIC METABOLIC PNL TOTAL CA: CPT

## 2023-07-28 PROCEDURE — 36415 COLL VENOUS BLD VENIPUNCTURE: CPT

## 2023-07-28 PROCEDURE — 83525 ASSAY OF INSULIN: CPT

## 2023-07-28 PROCEDURE — 82306 VITAMIN D 25 HYDROXY: CPT

## 2023-07-28 PROCEDURE — 80061 LIPID PANEL: CPT

## 2023-07-28 RX ORDER — DEXTROAMPHETAMINE SACCHARATE, AMPHETAMINE ASPARTATE MONOHYDRATE, DEXTROAMPHETAMINE SULFATE AND AMPHETAMINE SULFATE 5; 5; 5; 5 MG/1; MG/1; MG/1; MG/1
20 CAPSULE, EXTENDED RELEASE ORAL EVERY MORNING
Qty: 30 CAPSULE | Refills: 0 | Status: SHIPPED | OUTPATIENT
Start: 2023-07-28

## 2023-07-31 ENCOUNTER — TELEPHONE (OUTPATIENT)
Dept: BARIATRICS | Facility: CLINIC | Age: 28
End: 2023-07-31

## 2023-08-04 ENCOUNTER — OFFICE VISIT (OUTPATIENT)
Dept: BARIATRICS | Facility: CLINIC | Age: 28
End: 2023-08-04
Payer: COMMERCIAL

## 2023-08-04 VITALS
SYSTOLIC BLOOD PRESSURE: 130 MMHG | HEIGHT: 73 IN | RESPIRATION RATE: 17 BRPM | DIASTOLIC BLOOD PRESSURE: 80 MMHG | BODY MASS INDEX: 40.56 KG/M2 | HEART RATE: 93 BPM | WEIGHT: 306 LBS

## 2023-08-04 DIAGNOSIS — R41.840 ATTENTION DEFICIT: ICD-10-CM

## 2023-08-04 DIAGNOSIS — I10 PRIMARY HYPERTENSION: ICD-10-CM

## 2023-08-04 DIAGNOSIS — E66.01 MORBID OBESITY (HCC): Primary | ICD-10-CM

## 2023-08-04 DIAGNOSIS — Z91.89 AT RISK FOR SLEEP APNEA: ICD-10-CM

## 2023-08-04 PROCEDURE — 99214 OFFICE O/P EST MOD 30 MIN: CPT | Performed by: NURSE PRACTITIONER

## 2023-08-04 NOTE — PATIENT INSTRUCTIONS
I am sending the [facility]MARK MELISSA to your pharmacy. This will start the preauthorization process. My office takes care of that. It can take up to 3 weeks to process. Start Wegovy 0.25 mg subcutaneously once a week for 4 weeks, then increase to 0.5 mg subcutaneously each week. After you take the first pen of the starting dose of 0.25 mg, please message me to submit the next dose of 0.5 mg, as we will also likely need to do another preauth for that one. Side effects of Wegovy discussed: nausea, vomiting, diarrhea, and constipation. If severe abdominal pain develops, stop Wegovy and go to the ER, as this could be pancreatitis. Monitor heart rate while on Wegovy and if resting heart rate greater than 100 beats per minute, patient will notify me.

## 2023-08-04 NOTE — PROGRESS NOTES
Assessment/Plan: Morbid obesity ([address])  - Patient is pursuing Conservative Program  - Initial weight loss goal of 5-10% weight loss for improved health  -Not interested in weight loss surgery. - Already on Wellbutrin.  - Naltrexone tried in May 2023, but developed headaches. - Not currently a candidate for phentermine, as he is taking Adderall.   - Denies history of seizures or glaucoma. -Medication contract signed April 27, 2023.  - Patient denies personal history of pancreatitis. Patient also denies personal and family history of thyroid cancer and multiple endocrine neoplasia type 2 (MEN 2 tumor). - Struggling with cravings and is interested in trying another medication to help with weight loss. - FDA approved weight loss medications reviewed: Wegovy, Saxenda, Qsymia, Contrave, and Phentermine. [facility] and Cyprolly benitez discussed in detail.   - He made an informed decision to start [facility]. He is aware of the [facility] MELISSA shortage and that it may be difficult to obtain the needed doses. Despite that, he would like to go ahead and try to obtain it. - Side effects of Wegovy discussed: nausea, vomiting, diarrhea, and constipation. Hypoglycemia discussed. If severe abdominal pain develops, stop Wegovy and go to the ER, as this could be pancreatitis. Monitor heart rate while on Wegovy and if resting heart rate greater than 100 beats per minute, patient will notify me. - Labs reviewed: A1C, fasting insulin, BMP, and lipid panel 7/28/2023. HDL low and LDL elevated, which will all likely improve with weight loss. Remainder of the blood work was within acceptable range. Initial: 318 lbs BMI 41.96  Current: 306 lbs BMI 40.37  Change: -12 lbs  Goal: 220 lbs      Goals:  Do not skip meals. Get a protein shake or bar instead of skipping. Continue food logging, weighing, and measuring food. 2200 annie/day. Keep up the great work with water intake, at least 64 ounces daily. Exercise discussed.   Start walking or going to the gym. Start 2 days/week for 10 minutes of cardiovascular exercise and gradually increase to a goal of 5 days/week for 30 minutes and 2 days resistance training. Get protein with each meal  Limit starch to 1/2 cup per meal  Get 5-10 servings of fruit and vegetables daily. Start [place]. At risk for sleep apnea  - Seeing sleep medicine in Sept 2023. Hypertension  - Taking losartan. May improve with weight loss and lifestyle modification. Continue management with prescribing provider. Attention deficit  - Taking Wellbutrin and Adderall. Continue management with prescribing provider. Yumi was seen today for follow-up. Diagnoses and all orders for this visit:    Morbid obesity ([address])  -     Semaglutide-Weight Management (WEGOVY) 0.25 MG/0.5ML; Inject 0.5 mL (0.25 mg total) under the skin once a week    Primary hypertension  -     Semaglutide-Weight Management (WEGOVY) 0.25 MG/0.5ML; Inject 0.5 mL (0.25 mg total) under the skin once a week    At risk for sleep apnea    Attention deficit               Follow up in approximately 2 month nurse visit and 4 months with Non-Surgical Physician/Advanced Practitioner. Subjective:   Chief Complaint   Patient presents with   • Follow-up     MWM 4m f/u; waist-       Patient ID: Martha Barron  is a 24 y.o. male with excess weight/obesity here to pursue weight management. Patient is pursuing Conservative Program.   Most recent notes and records were reviewed. HPI    Wt Readings from Last 10 Encounters:   08/04/23 (!) 139 kg (306 lb)   07/27/23 (!) 139 kg (307 lb)   04/27/23 (!) 144 kg (318 lb)   03/23/23 (!) 146 kg (322 lb)   03/13/23 (!) 150 kg (331 lb)   02/09/23 (!) 150 kg (331 lb)   01/27/23 (!) 154 kg (339 lb)   01/05/23 (!) 154 kg (339 lb)   06/30/20 (!) 149 kg (328 lb 3.2 oz)   03/13/20 (!) 147 kg (324 lb)     Has been food logging. Gets around 2300 calories per day. Having cravings for sweets.      Already on Wellbutrin for attention deficit. Naltrexone added to mimic Contrave, but had headaches from naltrexone, and therefore stopped. 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    Family History   Problem Relation Age of Onset   • Asthma Mother    • Hypertension Mother    • Heart attack Mother    • No Known Problems Father    • Asthma Brother    • Cancer Neg Hx    • Diabetes Neg Hx    • Stroke Neg Hx    • Thyroid disease Neg Hx         Review of Systems   HENT: Negative for sore throat. Respiratory: Negative for cough and shortness of breath. Cardiovascular: Negative for chest pain and palpitations. Gastrointestinal: Negative for abdominal pain, constipation, diarrhea, nausea and vomiting. Denies GERD. Musculoskeletal: Negative for arthralgias and back pain. Skin: Negative for rash. Psychiatric/Behavioral: Negative for suicidal ideas. Denies anxiety and depression       Objective:  /80   Pulse 93   Resp 17   Ht 6' 1" (1.854 m)   Wt (!) 139 kg (306 lb)   BMI 40.37 kg/m²     Physical Exam  Vitals and nursing note reviewed. Constitutional   General appearance: Abnormal.  well developed and morbidly obese. Eyes No conjunctival injection. Ears, Nose, Mouth, and Throat Oral mucosa moist.   Pulmonary   Respiratory effort: No increased work of breathing or signs of respiratory distress.     Cardiovascular Examination of extremities for edema and/or varicosities: Normal.  no edema. Abdomen   Abdomen: Abnormal.  The abdomen was obese.     Musculoskeletal   Normal range of motion  Neurological   Gait and station: Normal.   Psychiatric   Orientation to person, place and time: Normal.    Affect: appropriate

## 2023-08-05 NOTE — ASSESSMENT & PLAN NOTE
- Patient is pursuing Conservative Program  - Initial weight loss goal of 5-10% weight loss for improved health  -Not interested in weight loss surgery. - Already on Wellbutrin.  - Naltrexone tried in May 2023, but developed headaches. - Not currently a candidate for phentermine, as he is taking Adderall.   - Denies history of seizures or glaucoma. -Medication contract signed April 27, 2023.  - Patient denies personal history of pancreatitis. Patient also denies personal and family history of thyroid cancer and multiple endocrine neoplasia type 2 (MEN 2 tumor). - Struggling with cravings and is interested in trying another medication to help with weight loss. - FDA approved weight loss medications reviewed: Wegovy, Saxenda, Qsymia, Contrave, and Phentermine. [facility]ORT [facility]EL[facility]SA and Aaliyah benitez discussed in detail.   - He made an informed decision to start [facility]. [facility] shortage and that it may be difficult to obtain the needed doses. Despite that, he would like to go ahead and try to obtain it. - Side effects of Wegovy discussed: nausea, vomiting, diarrhea, and constipation. Hypoglycemia discussed. If severe abdominal pain develops, stop Wegovy and go to the ER, as this could be pancreatitis. Monitor heart rate while on Wegovy and if resting heart rate greater than 100 beats per minute, patient will notify me. - Labs reviewed: A1C, fasting insulin, BMP, and lipid panel 7/28/2023. HDL low and LDL elevated, which will all likely improve with weight loss. Remainder of the blood work was within acceptable range. Initial: 318 lbs BMI 41.96  Current: 306 lbs BMI 40.37  Change: -12 lbs  Goal: 220 lbs      Goals:  Do not skip meals. Get a protein shake or bar instead of skipping. Continue food logging, weighing, and measuring food. 2200 annie/day. Keep up the great work with water intake, at least 64 ounces daily. Exercise discussed. Start walking or going to the gym.   Start 2 days/week for 10 minutes of cardiovascular exercise and gradually increase to a goal of 5 days/week for 30 minutes and 2 days resistance training. Get protein with each meal  Limit starch to 1/2 cup per meal  Get 5-10 servings of fruit and vegetables daily. Start [place].

## 2023-08-09 ENCOUNTER — PATIENT MESSAGE (OUTPATIENT)
Dept: BARIATRICS | Facility: CLINIC | Age: 28
End: 2023-08-09

## 2023-08-09 DIAGNOSIS — E66.01 MORBID OBESITY (HCC): Primary | ICD-10-CM

## 2023-08-09 DIAGNOSIS — I10 PRIMARY HYPERTENSION: ICD-10-CM

## 2023-08-09 NOTE — TELEPHONE ENCOUNTER
Ivon Little 8/9/2023 3:42 PM EDT      ----- Message -----  From: Lori Olivas  Sent: 8/9/2023 3:41 PM EDT  To: *  Subject: Wegovy- First dose     Hi Shobha,    I wanted to follow up quickly as requested. I took my first dose of Wegovy 0.25 this morning. And I am feeling well so far.      Best,  Muslim

## 2023-09-06 ENCOUNTER — PATIENT MESSAGE (OUTPATIENT)
Dept: BARIATRICS | Facility: CLINIC | Age: 28
End: 2023-09-06

## 2023-09-06 DIAGNOSIS — E66.01 MORBID OBESITY (HCC): Primary | ICD-10-CM

## 2023-09-06 DIAGNOSIS — I10 PRIMARY HYPERTENSION: ICD-10-CM

## 2023-09-07 RX ORDER — SEMAGLUTIDE 1 MG/.5ML
1 INJECTION, SOLUTION SUBCUTANEOUS WEEKLY
Qty: 2 ML | Refills: 0 | Status: SHIPPED | OUTPATIENT
Start: 2023-09-07

## 2023-09-07 NOTE — TELEPHONE ENCOUNTER
Kavon López 9/6/2023 4:43 PM EDT      ----- Message -----  From: Tiffanie Davison  Sent: 9/6/2023 4:29 PM EDT  To: *  Subject: Wegovy- First dose     Mark Yeager,    I wanted to follow up and let you know that I took my first dose of 0.5 mg Wegovy today.

## 2023-09-11 DIAGNOSIS — R41.840 ATTENTION DEFICIT: ICD-10-CM

## 2023-09-11 RX ORDER — DEXTROAMPHETAMINE SACCHARATE, AMPHETAMINE ASPARTATE MONOHYDRATE, DEXTROAMPHETAMINE SULFATE AND AMPHETAMINE SULFATE 5; 5; 5; 5 MG/1; MG/1; MG/1; MG/1
20 CAPSULE, EXTENDED RELEASE ORAL EVERY MORNING
Qty: 30 CAPSULE | Refills: 0 | Status: SHIPPED | OUTPATIENT
Start: 2023-09-11

## 2023-09-14 ENCOUNTER — OFFICE VISIT (OUTPATIENT)
Dept: SLEEP CENTER | Facility: CLINIC | Age: 28
End: 2023-09-14
Payer: COMMERCIAL

## 2023-09-14 VITALS
WEIGHT: 294 LBS | DIASTOLIC BLOOD PRESSURE: 82 MMHG | HEART RATE: 80 BPM | RESPIRATION RATE: 17 BRPM | SYSTOLIC BLOOD PRESSURE: 130 MMHG | BODY MASS INDEX: 38.97 KG/M2 | OXYGEN SATURATION: 98 % | HEIGHT: 73 IN

## 2023-09-14 DIAGNOSIS — Z91.89 AT RISK FOR SLEEP APNEA: Primary | ICD-10-CM

## 2023-09-14 DIAGNOSIS — E66.01 CLASS 2 SEVERE OBESITY DUE TO EXCESS CALORIES WITH SERIOUS COMORBIDITY AND BODY MASS INDEX (BMI) OF 38.0 TO 38.9 IN ADULT: ICD-10-CM

## 2023-09-14 DIAGNOSIS — R06.83 SNORING: ICD-10-CM

## 2023-09-14 DIAGNOSIS — I10 PRIMARY HYPERTENSION: ICD-10-CM

## 2023-09-14 DIAGNOSIS — J35.1 TONSILLAR HYPERTROPHY: ICD-10-CM

## 2023-09-14 PROBLEM — E66.812 CLASS 2 OBESITY WITH BODY MASS INDEX (BMI) OF 38.0 TO 38.9 IN ADULT: Status: ACTIVE | Noted: 2019-06-28

## 2023-09-14 PROBLEM — E66.9 CLASS 2 OBESITY WITH BODY MASS INDEX (BMI) OF 38.0 TO 38.9 IN ADULT: Status: ACTIVE | Noted: 2019-06-28

## 2023-09-14 PROCEDURE — 99204 OFFICE O/P NEW MOD 45 MIN: CPT | Performed by: NURSE PRACTITIONER

## 2023-09-14 NOTE — PROGRESS NOTES
Consultation - [address] [place] [address], 1995, MRN: [id number]    9/14/2023        Reason for Consult / Principal Problem:    Evaluation of possible Obstructive Sleep Apnea       Thank you for the opportunity of participating in the evaluation and care of this patient in the Sleep Clinic at [address] [place] [address]. Subjective:     HPI: Juli Forbes is a 34y.o. year old male. He presents for a consultation regarding concern of possible obstructive sleep apnea. He is currently working with weight management and was identified as being at risk for obstructive sleep apnea. [address] [place] [address]  5/8. He snores loudly. He denies daytime sleepiness. He takes Adderall XR 20mg daily for ADHD. He also takes Wellbutrin in the am.    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    Sleep Schedule:       Bedtime:  He goes to bed between midnight and 2:00am      Latency: This varies, can be at least 30 minutes      Wakeup time:  7:30-7:45am on work days and 9:30-10:00am, sometimes as late as 11:00am    Awakenings:       Frequency:  He does not wake up during the night       Daytime Sleepiness / Inappropriate Sleep:       Most severe:  He feels a "crash" in the late afternoon around 3-4:00pm, which he attributes to Adderall XR 20mg       Naps :  He does not take naps unless he had an unusually bad night of sleep      Inappropriate drowsiness / sleep:  He is able to maintain wakefulness with sedentary activities    Snoring:  He snores, currently sleeps alone but recently vacationed with brother who noted snoring with no other symptoms    Apnea: No witnessed apnea    Change in Weight:  He intentionally lost 45 lbs with weight management. Started CDASNY. Restless Leg Syndrome: no clinical symptoms consistent with this diagnosis     Other Complaints:  No reports of sleep walking, sleep talking, sleep paralysis or hallucinations surrounding sleep. He does not wake up with headaches. He clenches his jaw during sleep. Social History:      Caffeine:  One cup of coffee daily       Tobacco:   reports that he has never smoked. He has never used smokeless tobacco.     E-cig/Vaping:    E-Cigarette/Vaping   • E-Cigarette Use Never User       E-Cigarette/Vaping Substances   • Nicotine No    • THC No    • CBD No    • Flavoring No    • Other No    • Unknown No          Alcohol:   reports no history of alcohol use. none      Drugs:   reports no history of drug use. The review of systems and following portions of the patient's history were reviewed and updated as appropriate: allergies, current medications, past family history, past medical history, past social history, past surgical history and problem list.        Objective:       Vitals:    09/14/23 0758   BP: 130/82   Pulse: 80   Resp: 17   SpO2: 98%   Weight: 133 kg (294 lb)   Height: 6' 1" (1.854 m)     Body mass index is 38.79 kg/m². Neck Circumference: 15.5  [place] Sleepiness Scale:  Total score: 4      Current Outpatient Medications:   •  amphetamine-dextroamphetamine (ADDERALL XR, 20MG,) 20 MG 24 hr capsule, Take 1 capsule (20 mg total) by mouth every morning Max Daily Amount: 20 mg, Disp: 30 capsule, Rfl: 0  •  buPROPion (WELLBUTRIN XL) 150 mg 24 hr tablet, Take 1 tablet (150 mg total) by mouth every morning, Disp: 90 tablet, Rfl: 1  •  ergocalciferol (VITAMIN D2) 50,000 units, Take 1 capsule (50,000 Units total) by mouth once a week, Disp: 12 capsule, Rfl: 1  •  ibuprofen (MOTRIN) 600 mg tablet, TAKE ONE TABLET BY MOUTH EVERY 6 HOURS AS NEEDED FOR MILD PAIN, Disp: , Rfl:   •  losartan (COZAAR) 50 mg tablet, Take 1 tablet (50 mg total) by mouth daily, Disp: 90 tablet, Rfl: 1  • Semaglutide-Weight Management [facility]) 1 MG/0.5ML, Inject 0.5 mL (1 mg total) under the skin once a week, Disp: 2 mL, Rfl: 0    Physical Exam  General Appearance:   Alert, cooperative, no distress, appears stated age, obese     Head:   Normocephalic, without obvious abnormality, atraumatic     Eyes:   Conjunctiva/corneas clear          Nose:  Nares normal, septum midline, mucosa normal, no drainage or sinus tenderness           Throat:  Lips, teeth and gums normal; tongue normal in size and midline in position; mucosa moist with crowded oropharyngeal airway, uvula large, tonsils +2/4 bilaterally, Mallampati class 3       Neck:  Supple, short, symmetrical, trachea midline, no adenopathy; no thyromegaly noted, no carotid bruit or JVD     Lungs:      Clear to auscultation bilaterally, respirations unlabored     Heart:   Regular rate and rhythm, S1 and S2 normal, no murmur, rub or gallop       Extremities:  Extremities normal, atraumatic, no cyanosis or edema       Skin:  Skin color, texture, turgor normal, no rashes or lesions       Neurologic:  No focal deficits noted. ASSESSMENT / PLAN     1. At risk for sleep apnea  Ambulatory referral to Sleep Medicine    Home Study      2. Snoring  Home Study      3. Primary hypertension  Home Study      4. Class 2 severe obesity due to excess calories with serious comorbidity and body mass index (BMI) of 38.0 to 38.9 in adult [facility])  Home Study      5. Tonsillar hypertrophy              Counseling / Coordination of Care  I have spent a total time of 45 minutes on 09/14/23 in caring for this patient including Risks and benefits of tx options, Instructions for management, Patient and family education, Importance of tx compliance, Risk factor reductions, Impressions, Counseling / Coordination of care, Documenting in the medical record, Reviewing / ordering tests, medicine, procedures   and Obtaining or reviewing history  .     Today we discussed the anatomy and physiology of the upper airway. I pointed out how changes in this region can result in both snoring and abnormal breathing events including apneas and hypopneas. We discussed that symptoms, including daytime sleepiness may be masked by use of Adderall XR and Wellbutrin for ADHD. I explained the most common co-morbidities of untreated sleep apnea. After this we talked about some forms of treatment including application of positive airway pressure, mandibular advancement devices and surgery (tonsillectomy, UPPP, Inspire). He would consider use of PAP therapy, if ADRIEN is confirmed. He would likely do best with a full face mask, if ADRIEN is considered. In order to evaluate the possibility of Obstructive Sleep Apnea as a cause of the patient's symptoms, a home sleep study will be completed to identify the presence or absence of abnormal nocturnal breathing. If significant abnormal nocturnal breathing is detected, nasal CPAP will be titrated to find the optimum pressure needed to maintain upper airway patency during sleep. This may be accomplished using APAP or may require an in lab titration study. Following testing, the patient will return to the [address] for set up of CPAP equipment with follow up visit to review compliance and effectiveness of treatment 31-91 days after set up. The following instructions have been given to the patient today:    Patient Instructions   1. Schedule home sleep study  2. The nurse will call with results and plan of treatment      Nursing Support:  When: Monday through Friday 7A-5PM except holidays  Where: Our direct line is [phone]. If you are having a true emergency please call 911. In the event that the line is busy or it is after hours please leave a voice message and we will return your call. Please speak clearly, leaving your full name, birth date, best number to reach you and the reason for your call. Medication refills:  We will need the name of the medication, the dosage, the ordering provider, whether you get a 30 or 90 day refill, and the pharmacy name and address. Medications will be ordered by the provider only. Nurses cannot call in prescriptions. Please allow 7 days for medication refills. Physician requested updates: If your provider requested that you call with an update after starting medication, please be ready to provide us the medication and dosage, what time you take your medication, the time you attempt to fall asleep, time you fall asleep, when you wake up, and what time you get out of bed. Sleep Study Results: We will contact you with sleep study results and/or next steps after the physician has reviewed your testing. Halie Lopez, [address]      Portions of the record may have been created with voice recognition software. Occasional wrong word or "sound a like" substitutions may have occurred due to the inherent limitations of voice recognition software. Read the chart carefully and recognize, using context, where substitutions have occurred.

## 2023-09-14 NOTE — PATIENT INSTRUCTIONS
Schedule home sleep study  The nurse will call with results and plan of treatment      Nursing Support:  When: Monday through Friday 7A-5PM except holidays  Where: Our direct line is [phone]. If you are having a true emergency please call 911. In the event that the line is busy or it is after hours please leave a voice message and we will return your call. Please speak clearly, leaving your full name, birth date, best number to reach you and the reason for your call. Medication refills: We will need the name of the medication, the dosage, the ordering provider, whether you get a 30 or 90 day refill, and the pharmacy name and address. Medications will be ordered by the provider only. Nurses cannot call in prescriptions. Please allow 7 days for medication refills. Physician requested updates: If your provider requested that you call with an update after starting medication, please be ready to provide us the medication and dosage, what time you take your medication, the time you attempt to fall asleep, time you fall asleep, when you wake up, and what time you get out of bed. Sleep Study Results: We will contact you with sleep study results and/or next steps after the physician has reviewed your testing.

## 2023-09-22 ENCOUNTER — HOSPITAL ENCOUNTER (OUTPATIENT)
Dept: SLEEP CENTER | Facility: CLINIC | Age: 28
Discharge: HOME | End: 2023-09-22
Payer: COMMERCIAL

## 2023-09-22 DIAGNOSIS — I10 PRIMARY HYPERTENSION: ICD-10-CM

## 2023-09-22 DIAGNOSIS — E66.01 CLASS 2 SEVERE OBESITY DUE TO EXCESS CALORIES WITH SERIOUS COMORBIDITY AND BODY MASS INDEX (BMI) OF 38.0 TO 38.9 IN ADULT: ICD-10-CM

## 2023-09-22 DIAGNOSIS — Z91.89 AT RISK FOR SLEEP APNEA: ICD-10-CM

## 2023-09-22 DIAGNOSIS — R06.83 SNORING: ICD-10-CM

## 2023-09-22 PROCEDURE — G0399 HOME SLEEP TEST/TYPE 3 PORTA: HCPCS

## 2023-09-26 NOTE — PROGRESS NOTES
Home Sleep Study Documentation    HOME STUDY DEVICE: Noxturnal no                                           Cara G3 yes      Pre-Sleep Home Study:    Set-up and instructions performed by: MA-Tech    Technician performed demonstration for Patient: yes    Return demonstration performed by Patient: yes    Written instructions provided to Patient: yes    Patient signed consent form: yes        Post-Sleep Home Study:    Additional comments by Patient:         Home Sleep Study Failed:no:    Failure reason: N/A    Reported or Detected: N/A    Scored by: IMMANUEL Bruce

## 2023-09-29 PROCEDURE — 95806 SLEEP STUDY UNATT&RESP EFFT: CPT | Performed by: PSYCHIATRY & NEUROLOGY

## 2023-10-01 DIAGNOSIS — G47.33 OSA (OBSTRUCTIVE SLEEP APNEA): Primary | ICD-10-CM

## 2023-10-01 DIAGNOSIS — I10 PRIMARY HYPERTENSION: ICD-10-CM

## 2023-10-05 ENCOUNTER — TELEPHONE (OUTPATIENT)
Dept: BARIATRICS | Facility: CLINIC | Age: 28
End: 2023-10-05

## 2023-10-05 ENCOUNTER — CLINICAL SUPPORT (OUTPATIENT)
Dept: BARIATRICS | Facility: CLINIC | Age: 28
End: 2023-10-05

## 2023-10-05 VITALS
WEIGHT: 286.2 LBS | SYSTOLIC BLOOD PRESSURE: 116 MMHG | DIASTOLIC BLOOD PRESSURE: 70 MMHG | BODY MASS INDEX: 37.93 KG/M2 | HEART RATE: 100 BPM | RESPIRATION RATE: 16 BRPM | HEIGHT: 73 IN

## 2023-10-05 DIAGNOSIS — R63.5 ABNORMAL WEIGHT GAIN: Primary | ICD-10-CM

## 2023-10-05 DIAGNOSIS — E66.9 OBESITY, CLASS II, BMI 35-39.9: Primary | ICD-10-CM

## 2023-10-05 DIAGNOSIS — I10 PRIMARY HYPERTENSION: ICD-10-CM

## 2023-10-05 PROCEDURE — RECHECK

## 2023-10-05 RX ORDER — SEMAGLUTIDE 1.7 MG/.75ML
1.7 INJECTION, SOLUTION SUBCUTANEOUS WEEKLY
Qty: 3 ML | Refills: 0 | Status: SHIPPED | OUTPATIENT
Start: 2023-10-05

## 2023-10-05 NOTE — PROGRESS NOTES
Patient last visit weight:  Patient current visit weight:    If you are taking phentermine or other oral weight loss medications, are you experiencing any of the following symptoms:  Headache:   Blurred Vision:   Chest Pain:   Palpitations: Insomnia:   SPECIFY ORAL MEDICATION AND DOSAGE:     If you are taking an injectable medication,  are you experiencing any of the following symptoms:  Bloating: no  Nausea:no  Vomiting: no  Constipation: no  Diarrhea:no  SPECIFY INJECTABLE MEDICATION AND CURRENT DOSAGE:Wegovy      Vitals:    - Is BP less than 100/60? no  - Is BP greater than 140/90? no  - Is HR greater than 100?no  **If yes to any of the above, have patient relax and repeat in 5-10 minutes**    Repeat values:    - Is BP less than 100/60?  - Is BP greater than 140/90?  - Is HR greater than 100?   **If values remain outside of ranges above, please consult provider for next steps**

## 2023-10-05 NOTE — TELEPHONE ENCOUNTER
Patient came in today for a nurse visit. Patient is a bit concern about his HR being high. Patient states that he had a stress test about a year ago and it was high as well. He says that even doing a light activity his HR goes above 100 and it takes about 30-45mins for it to come down. Also patient wanted to informed you that he inject his first pen of 1mg yesterday. He stated that you normally sent in the script of the next dose to his [facility] services.

## 2023-10-06 ENCOUNTER — TELEPHONE (OUTPATIENT)
Dept: SLEEP CENTER | Facility: CLINIC | Age: 28
End: 2023-10-06

## 2023-10-06 NOTE — TELEPHONE ENCOUNTER
----- Message from Colette Saul, [address] [place] [address] sent at 10/1/2023 11:15 AM EDT -----  Mild obstructive sleep apnea was confirmed during the home sleep study and is likely contributing to symptoms. Recommend trial of auto-CPAP. A prescription for equipment has been provided. Patient to be scheduled for set up of equipment, followed by compliance follow up 31-91 days after set up.

## 2023-10-06 NOTE — TELEPHONE ENCOUNTER
Called patient and advised of sleep study results and order for APAP. Patient states he is currently working with weight management and has lost 20 pounds so far. He plans to continue to lose weight to hopefully resolve the sleep apnea. He declined to schedule DME set up and would like to discuss the possibility of more conservative treatments. Scheduled follow up with Frederic Garcia 2/22/24.   Added to wait list.

## 2023-11-01 ENCOUNTER — PATIENT MESSAGE (OUTPATIENT)
Dept: BARIATRICS | Facility: CLINIC | Age: 28
End: 2023-11-01

## 2023-11-01 DIAGNOSIS — I10 PRIMARY HYPERTENSION: ICD-10-CM

## 2023-11-01 DIAGNOSIS — E66.9 OBESITY, CLASS II, BMI 35-39.9: Primary | ICD-10-CM

## 2023-11-01 RX ORDER — SEMAGLUTIDE 2.4 MG/.75ML
2.4 INJECTION, SOLUTION SUBCUTANEOUS WEEKLY
Qty: 9 ML | Refills: 0 | Status: SHIPPED | OUTPATIENT
Start: 2023-11-01

## 2023-11-30 ENCOUNTER — OFFICE VISIT (OUTPATIENT)
Dept: INTERNAL MEDICINE CLINIC | Facility: OTHER | Age: 28
End: 2023-11-30
Payer: COMMERCIAL

## 2023-11-30 VITALS
SYSTOLIC BLOOD PRESSURE: 120 MMHG | TEMPERATURE: 98 F | BODY MASS INDEX: 36.31 KG/M2 | HEART RATE: 78 BPM | WEIGHT: 274 LBS | DIASTOLIC BLOOD PRESSURE: 80 MMHG | OXYGEN SATURATION: 96 % | HEIGHT: 73 IN

## 2023-11-30 DIAGNOSIS — E78.5 HYPERLIPIDEMIA, UNSPECIFIED HYPERLIPIDEMIA TYPE: ICD-10-CM

## 2023-11-30 DIAGNOSIS — E55.9 VITAMIN D DEFICIENCY: ICD-10-CM

## 2023-11-30 DIAGNOSIS — I10 PRIMARY HYPERTENSION: Primary | ICD-10-CM

## 2023-11-30 DIAGNOSIS — E66.01 CLASS 2 SEVERE OBESITY DUE TO EXCESS CALORIES WITH SERIOUS COMORBIDITY AND BODY MASS INDEX (BMI) OF 36.0 TO 36.9 IN ADULT: ICD-10-CM

## 2023-11-30 DIAGNOSIS — R41.840 ATTENTION DEFICIT: ICD-10-CM

## 2023-11-30 PROCEDURE — 99214 OFFICE O/P EST MOD 30 MIN: CPT | Performed by: NURSE PRACTITIONER

## 2023-11-30 NOTE — ASSESSMENT & PLAN NOTE
- following with weight management  - currently on Wegovy  - he has lost about 70 lbs in the last year

## 2023-11-30 NOTE — PROGRESS NOTES
Assessment/Plan:    Problem List Items Addressed This Visit       Attention deficit     - controlled on Adderall XR 20mg daily   - wean off wellbutrin but pt request         Hyperlipidemia    Relevant Orders    Comprehensive metabolic panel    Lipid Panel with Direct LDL reflex    Vitamin D deficiency     - continue vitamin D3 2000 IU daily   - repeat prior to next visit          Relevant Orders    Vitamin D 25 hydroxy    Class 2 obesity with body mass index (BMI) of 36.0 to 36.9 in adult     - following with weight management  - currently on Wegovy  - he has lost about 70 lbs in the last year         Primary hypertension - Primary     - controlled on losartan 50mg daily  - advised to monitor BP at home periodically. Hopefully we will be able to wean him off of this medication in the future as he continues to lose weight.   - follow up 6 months         Relevant Orders    Comprehensive metabolic panel       BMI Counseling: Body mass index is 36.15 kg/m². Discussed the patient's BMI with him. The BMI is above normal. Patient referred to weight management due to patient being obese. following with weight management, on wegovy       Depression Screening and Follow-up Plan: Patient was screened for depression during today's encounter. They screened negative with a PHQ-2 score of 0. M*artaculous software was used to dictate this note. It may contain errors with dictating incorrect words or incorrect spelling. Please contact the provider directly with any questions. Subjective:      Patient ID: Radha Alvares is a 34 y.o. male. HPI    Patient presents today for 3 month follow up  He is following with weight management and is on wegovy now. He had lost 70 lbs in the last year. ADHD - he is controlled on Adderall XR 20mg daily. He feels the dose is working well for him. He is able to focus better and complete tasks more smoothly.    He was initially on wellbutrin XL 150mg daily for ADHD but would like to wean off of this.     HTN - controlled on losartan 50mg daily. He has intermittent lightheadedness with standing. Vitamin D- he is taking vitamin D3 2000 IU most days     The following portions of the patient's history were reviewed and updated as appropriate: allergies, current medications, past family history, past medical history, past social history, past surgical history, and problem list.    Review of Systems   Constitutional:  Negative for activity change, appetite change and unexpected weight change. Respiratory:  Negative for chest tightness and shortness of breath. Psychiatric/Behavioral:  Negative for decreased concentration and sleep disturbance. Past Medical History:   Diagnosis Date    ADHD          Current Outpatient Medications:     amphetamine-dextroamphetamine (ADDERALL XR, 20MG,) 20 MG 24 hr capsule, Take 1 capsule (20 mg total) by mouth every morning Max Daily Amount: 20 mg, Disp: 30 capsule, Rfl: 0    ibuprofen (MOTRIN) 600 mg tablet, TAKE ONE TABLET BY MOUTH EVERY 6 HOURS AS NEEDED FOR MILD PAIN, Disp: , Rfl:     losartan (COZAAR) 50 mg tablet, Take 1 tablet (50 mg total) by mouth daily, Disp: 90 tablet, Rfl: 1    Semaglutide-Weight Management (Wegovy) 2.4 MG/0.75ML, Inject 0.75 mL (2.4 mg total) under the skin once a week, Disp: 9 mL, Rfl: 0    No Known Allergies    Social History   Past Surgical History:   Procedure Laterality Date    NO PAST SURGERIES      ROOT CANAL  11/2022     Family History   Problem Relation Age of Onset    Asthma Mother     Hypertension Mother     Heart attack Mother     No Known Problems Father     Asthma Brother     Cancer Neg Hx     Diabetes Neg Hx     Stroke Neg Hx     Thyroid disease Neg Hx        Objective:  /80 (BP Location: Left arm, Patient Position: Sitting, Cuff Size: Large)   Pulse 78   Temp 98 °F (36.7 °C) (Temporal)   Ht 6' 1" (1.854 m)   Wt 124 kg (274 lb)   SpO2 96%   BMI 36.15 kg/m²      Physical Exam  Vitals reviewed. Constitutional:       General: He is not in acute distress. Appearance: Normal appearance. He is obese. He is not diaphoretic. HENT:      Head: Normocephalic and atraumatic. Eyes:      Extraocular Movements: Extraocular movements intact. Conjunctiva/sclera: Conjunctivae normal.      Pupils: Pupils are equal, round, and reactive to light. Cardiovascular:      Rate and Rhythm: Normal rate and regular rhythm. Heart sounds: Normal heart sounds. No murmur heard. Pulmonary:      Effort: Pulmonary effort is normal. No respiratory distress. Breath sounds: Normal breath sounds. No wheezing, rhonchi or rales. Neurological:      Mental Status: He is alert and oriented to person, place, and time. Mental status is at baseline. Psychiatric:         Mood and Affect: Mood normal.         Behavior: Behavior normal.         Thought Content:  Thought content normal.         Judgment: Judgment normal.

## 2023-11-30 NOTE — ASSESSMENT & PLAN NOTE
- controlled on losartan 50mg daily  - advised to monitor BP at home periodically.  Hopefully we will be able to wean him off of this medication in the future as he continues to lose weight.   - follow up 6 months

## 2023-12-12 DIAGNOSIS — R41.840 ATTENTION DEFICIT: ICD-10-CM

## 2023-12-12 RX ORDER — DEXTROAMPHETAMINE SACCHARATE, AMPHETAMINE ASPARTATE MONOHYDRATE, DEXTROAMPHETAMINE SULFATE AND AMPHETAMINE SULFATE 5; 5; 5; 5 MG/1; MG/1; MG/1; MG/1
20 CAPSULE, EXTENDED RELEASE ORAL EVERY MORNING
Qty: 30 CAPSULE | Refills: 0 | Status: SHIPPED | OUTPATIENT
Start: 2023-12-12

## 2023-12-14 ENCOUNTER — OFFICE VISIT (OUTPATIENT)
Dept: BARIATRICS | Facility: CLINIC | Age: 28
End: 2023-12-14
Payer: COMMERCIAL

## 2023-12-14 VITALS
SYSTOLIC BLOOD PRESSURE: 118 MMHG | HEIGHT: 73 IN | WEIGHT: 266.4 LBS | DIASTOLIC BLOOD PRESSURE: 70 MMHG | HEART RATE: 90 BPM | RESPIRATION RATE: 16 BRPM | BODY MASS INDEX: 35.31 KG/M2

## 2023-12-14 DIAGNOSIS — I10 PRIMARY HYPERTENSION: ICD-10-CM

## 2023-12-14 DIAGNOSIS — E66.9 OBESITY (BMI 30-39.9): Primary | ICD-10-CM

## 2023-12-14 DIAGNOSIS — G47.33 OSA (OBSTRUCTIVE SLEEP APNEA): ICD-10-CM

## 2023-12-14 PROCEDURE — 99214 OFFICE O/P EST MOD 30 MIN: CPT | Performed by: NURSE PRACTITIONER

## 2023-12-14 NOTE — ASSESSMENT & PLAN NOTE
- Patient is pursuing Conservative Program  - Initial weight loss goal of 5-10% weight loss for improved health-met  - Not interested in weight loss surgery.  - Naltrexone added to Wellbutrin in May 2023, but developed headaches. - Not currently a candidate for phentermine, as he is taking Adderall.   - Denies history of seizures or glaucoma. -Medication contract signed April 27, 2023.  - Patient denies personal history of pancreatitis. Patient also denies personal and family history of thyroid cancer and multiple endocrine neoplasia type 2 (MEN 2 tumor). - Continue Wegovy 2.4 mg weekly, tolerating well and helping with appetite. - Screening labs done July 2023. Initial: 318 lbs BMI 41.96  Last visit: 306 lbs BMI 40.37  Current: 266.4 lbs BMI 35.15  Change: -52 lbs (-40 lbs since the last OV)  Goal: 220 lbs      Goals:  Do not skip meals. Can supplement with protein bars or shakes. Restart food logging, weighing and measuring food. 2000 calories per day. Try not to go lower than 1500 calories per day. He will keep an eye on this and if calories are too low can reduce the dose of Wegovy. Get at least 64 oz of water daily. Exercise discussed. Start walking 2 days/week for 10 minutes and gradually increase to a goal of 5 days/week for 30 minutes and 2 days resistance training. Get protein with each meal  Get 5-10 servings of fruit and vegetables daily. Continue [id number].

## 2023-12-14 NOTE — PROGRESS NOTES
Assessment/Plan:     Obesity (BMI 30-39.9)  - Patient is pursuing Conservative Program  - Initial weight loss goal of 5-10% weight loss for improved health-met  - Not interested in weight loss surgery.  - Naltrexone added to Wellbutrin in May 2023, but developed headaches. - Not currently a candidate for phentermine, as he is taking Adderall.   - Denies history of seizures or glaucoma. -Medication contract signed April 27, 2023.  - Patient denies personal history of pancreatitis. Patient also denies personal and family history of thyroid cancer and multiple endocrine neoplasia type 2 (MEN 2 tumor). - Continue Wegovy 2.4 mg weekly, tolerating well and helping with appetite. - Screening labs done July 2023. Initial: 318 lbs BMI 41.96  Last visit: 306 lbs BMI 40.37  Current: 266.4 lbs BMI 35.15  Change: -52 lbs (-40 lbs since the last OV)  Goal: 220 lbs      Goals:  Do not skip meals. Can supplement with protein bars or shakes. Restart food logging, weighing and measuring food. 2000 calories per day. Try not to go lower than 1500 calories per day. He will keep an eye on this and if calories are too low can reduce the dose of Wegovy. Get at least 64 oz of water daily. Exercise discussed. Start walking 2 days/week for 10 minutes and gradually increase to a goal of 5 days/week for 30 minutes and 2 days resistance training. Get protein with each meal  Get 5-10 servings of fruit and vegetables daily. Continue [id number]. ADRIEN (obstructive sleep apnea)  - Diagnosed with mild ADRIEN, will be seeing sleep medicine in follow-up. Primary hypertension  - Taking losartan. May improve with weight loss and lifestyle modification. Continue management with prescribing provider. Yumi was seen today for follow-up.     Diagnoses and all orders for this visit:    Obesity (BMI 30-39.9)    ADRIEN (obstructive sleep apnea)    Primary hypertension                 Follow up in approximately  2 month nurse visit and 4 months  with Non-Surgical Physician/Advanced Practitioner. Subjective:   Chief Complaint   Patient presents with    Follow-up     MWM- 4mth f/u- Waist 46.5in       Patient ID: Juli Forbes  is a 34 y.o. male with excess weight/obesity here to pursue weight management. Patient is pursuing Conservative Program.   Most recent notes and records were reviewed. HPI    Wt Readings from Last 10 Encounters:   12/14/23 121 kg (266 lb 6.4 oz)   11/30/23 124 kg (274 lb)   10/05/23 130 kg (286 lb 3.2 oz)   09/14/23 133 kg (294 lb)   08/04/23 (!) 139 kg (306 lb)   07/27/23 (!) 139 kg (307 lb)   04/27/23 (!) 144 kg (318 lb)   03/23/23 (!) 146 kg (322 lb)   03/13/23 (!) 150 kg (331 lb)   02/09/23 (!) 150 kg (331 lb)       Taking Wegovy 2.4 mg weekly. No negative side effects and helping with appetite. Not food logging. 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    Family History   Problem Relation Age of Onset    Asthma Mother     Hypertension Mother     Heart attack Mother     No Known Problems Father     Asthma Brother     Cancer Neg Hx     Diabetes Neg Hx     Stroke Neg Hx     Thyroid disease Neg Hx         Review of Systems   HENT:  Negative for sore throat. Respiratory:  Positive for cough. Negative for shortness of breath. Cardiovascular:  Negative for chest pain and palpitations. Gastrointestinal:  Negative for abdominal pain, constipation, diarrhea, nausea and vomiting. Denies GERD. Musculoskeletal:  Negative for arthralgias and back pain. Skin:  Negative for rash. Psychiatric/Behavioral:  Negative for suicidal ideas. Denies anxiety and depression       Objective:  /70   Pulse 90   Resp 16   Ht 6' 1" (1.854 m)   Wt 121 kg (266 lb 6.4 oz)   BMI 35.15 kg/m²     Physical Exam  Vitals and nursing note reviewed. Constitutional   General appearance: Abnormal.  well developed and obese. Eyes No conjunctival injection. Ears, Nose, Mouth, and Throat Oral mucosa moist.   Pulmonary   Respiratory effort: No increased work of breathing or signs of respiratory distress. Cardiovascular     Examination of extremities for edema and/or varicosities: Normal.  no edema. Abdomen   Abdomen: Abnormal.  The abdomen was obese.     Musculoskeletal   Normal range of motion  Neurological   Gait and station: Normal.    Psychiatric   Orientation to person, place and time: Normal.    Affect: appropriate

## 2024-01-24 DIAGNOSIS — I10 PRIMARY HYPERTENSION: ICD-10-CM

## 2024-01-24 DIAGNOSIS — E66.9 OBESITY, CLASS II, BMI 35-39.9: ICD-10-CM

## 2024-01-24 DIAGNOSIS — R41.840 ATTENTION DEFICIT: ICD-10-CM

## 2024-01-24 RX ORDER — LOSARTAN POTASSIUM 50 MG/1
50 TABLET ORAL DAILY
Qty: 90 TABLET | Refills: 1 | Status: SHIPPED | OUTPATIENT
Start: 2024-01-24

## 2024-01-24 RX ORDER — SEMAGLUTIDE 2.4 MG/.75ML
2.4 INJECTION, SOLUTION SUBCUTANEOUS WEEKLY
Qty: 9 ML | Refills: 0 | Status: SHIPPED | OUTPATIENT
Start: 2024-01-24

## 2024-01-24 RX ORDER — DEXTROAMPHETAMINE SACCHARATE, AMPHETAMINE ASPARTATE MONOHYDRATE, DEXTROAMPHETAMINE SULFATE AND AMPHETAMINE SULFATE 5; 5; 5; 5 MG/1; MG/1; MG/1; MG/1
20 CAPSULE, EXTENDED RELEASE ORAL EVERY MORNING
Qty: 30 CAPSULE | Refills: 0 | Status: SHIPPED | OUTPATIENT
Start: 2024-01-24

## 2024-02-07 ENCOUNTER — OFFICE VISIT (OUTPATIENT)
Dept: INTERNAL MEDICINE CLINIC | Facility: OTHER | Age: 29
End: 2024-02-07
Payer: COMMERCIAL

## 2024-02-07 VITALS
TEMPERATURE: 98.2 F | WEIGHT: 261 LBS | OXYGEN SATURATION: 98 % | DIASTOLIC BLOOD PRESSURE: 78 MMHG | HEART RATE: 98 BPM | BODY MASS INDEX: 34.59 KG/M2 | HEIGHT: 73 IN | SYSTOLIC BLOOD PRESSURE: 126 MMHG

## 2024-02-07 DIAGNOSIS — R05.3 CHRONIC COUGH: Primary | ICD-10-CM

## 2024-02-07 PROCEDURE — 99213 OFFICE O/P EST LOW 20 MIN: CPT | Performed by: NURSE PRACTITIONER

## 2024-02-07 RX ORDER — BENZONATATE 100 MG/1
CAPSULE ORAL
COMMUNITY
Start: 2024-01-25

## 2024-02-07 RX ORDER — ALBUTEROL SULFATE 90 UG/1
1-2 AEROSOL, METERED RESPIRATORY (INHALATION)
COMMUNITY
Start: 2024-01-25 | End: 2024-02-24

## 2024-02-07 NOTE — ASSESSMENT & PLAN NOTE
-Ongoing x 2 months  -Start Flonase 2 sprays each nostril once daily  -Start Allegra once daily  -Continue albuterol 1 to 2 puffs every 6 hours as needed  -Follow-up 2 weeks

## 2024-02-07 NOTE — PROGRESS NOTES
Assessment/Plan:    Problem List Items Addressed This Visit       Chronic cough - Primary     -Ongoing x 2 months  -Start Flonase 2 sprays each nostril once daily  -Start Allegra once daily  -Continue albuterol 1 to 2 puffs every 6 hours as needed  -Follow-up 2 weeks                 M*Guest of [employer] Guest software was used to dictate this note.  It may contain errors with dictating incorrect words or incorrect spelling. Please contact the provider directly with any questions.    Subjective:      Patient ID: Yumi Faria is a 28 y.o. male.      Patient presents today with chronic cough x 2 months.he started in early December with a cough that developed into a URI which lasted about a week and then resolved but the cough has been lingering. The cough has been persistent without any significant change. He has tried using Mucinex DM which helps his symptoms and tessalon perles 1-2 times a day which both improve the cough but the symptoms return the next morning.   He is not using any nasal sprays.   The cough is mostly dry with minimal sputum production.   He denies any chest tightness or wheezing, no SOB.   No fevers.   Denies GERD    The following portions of the patient's history were reviewed and updated as appropriate: allergies, current medications, past family history, past medical history, past social history, past surgical history, and problem list.    Review of Systems   Constitutional:  Negative for chills and fever.   HENT:  Positive for postnasal drip and rhinorrhea. Negative for ear pain and sore throat.    Respiratory:  Positive for cough. Negative for shortness of breath and wheezing.    Cardiovascular:  Negative for chest pain.   Musculoskeletal:  Negative for myalgias.   Skin:  Negative for rash.   Neurological:  Negative for headaches.         Past Medical History:   Diagnosis Date    ADHD          Current Outpatient Medications:     albuterol (PROVENTIL HFA,VENTOLIN HFA) 90 mcg/act inhaler, Inhale 1-2 puffs, Disp:  ", Rfl:     amphetamine-dextroamphetamine (ADDERALL XR, 20MG,) 20 MG 24 hr capsule, Take 1 capsule (20 mg total) by mouth every morning Max Daily Amount: 20 mg, Disp: 30 capsule, Rfl: 0    benzonatate (TESSALON PERLES) 100 mg capsule, TAKE ONE CAPSULE BY MOUTH THREE TIMES A DAY AS NEEDED, DO NOT BREAK, CHEW, DISSOLVE, CUT OR CRUSH, Disp: , Rfl:     ibuprofen (MOTRIN) 600 mg tablet, as needed, Disp: , Rfl:     losartan (COZAAR) 50 mg tablet, Take 1 tablet (50 mg total) by mouth daily, Disp: 90 tablet, Rfl: 1    Semaglutide-Weight Management (Wegovy) 2.4 MG/0.75ML, Inject 0.75 mL (2.4 mg total) under the skin once a week, Disp: 9 mL, Rfl: 0    VITAMIN D PO, Take by mouth, Disp: , Rfl:     No Known Allergies    Social History   Past Surgical History:   Procedure Laterality Date    NO PAST SURGERIES      ROOT CANAL  11/2022     Family History   Problem Relation Age of Onset    Asthma Mother     Hypertension Mother     Heart attack Mother     Coronary artery disease Father     Asthma Brother     Cancer Neg Hx     Diabetes Neg Hx     Stroke Neg Hx     Thyroid disease Neg Hx        Objective:  /78 (BP Location: Left arm, Patient Position: Sitting, Cuff Size: Standard)   Pulse 98   Temp 98.2 °F (36.8 °C) (Temporal)   Ht 6' 1\" (1.854 m)   Wt 118 kg (261 lb)   SpO2 98%   BMI 34.43 kg/m²      Physical Exam  Vitals reviewed.   Constitutional:       General: He is not in acute distress.     Appearance: Normal appearance. He is obese. He is not diaphoretic.   HENT:      Head: Normocephalic and atraumatic.      Nose: Rhinorrhea present.      Mouth/Throat:      Mouth: Mucous membranes are moist.      Pharynx: Oropharynx is clear. No oropharyngeal exudate or posterior oropharyngeal erythema.   Eyes:      Extraocular Movements: Extraocular movements intact.      Conjunctiva/sclera: Conjunctivae normal.      Pupils: Pupils are equal, round, and reactive to light.   Cardiovascular:      Rate and Rhythm: Normal rate and " regular rhythm.      Heart sounds: Normal heart sounds. No murmur heard.  Pulmonary:      Effort: Pulmonary effort is normal. No respiratory distress.      Breath sounds: Normal breath sounds. No wheezing, rhonchi or rales.      Comments: Dry cough provoked by deep breathing  Neurological:      Mental Status: He is alert and oriented to person, place, and time. Mental status is at baseline.   Psychiatric:         Mood and Affect: Mood normal.         Behavior: Behavior normal.         Thought Content: Thought content normal.         Judgment: Judgment normal.

## 2024-02-14 ENCOUNTER — CLINICAL SUPPORT (OUTPATIENT)
Dept: BARIATRICS | Facility: CLINIC | Age: 29
End: 2024-02-14

## 2024-02-14 VITALS
DIASTOLIC BLOOD PRESSURE: 75 MMHG | HEIGHT: 71 IN | WEIGHT: 252.8 LBS | RESPIRATION RATE: 16 BRPM | HEART RATE: 98 BPM | SYSTOLIC BLOOD PRESSURE: 110 MMHG | BODY MASS INDEX: 35.39 KG/M2

## 2024-02-14 DIAGNOSIS — R63.5 ABNORMAL WEIGHT GAIN: Primary | ICD-10-CM

## 2024-02-14 PROCEDURE — RECHECK

## 2024-02-14 NOTE — PROGRESS NOTES
Patient last visit weight:  Patient current visit weight:    If you are taking phentermine or other oral weight loss medications, are you experiencing any of the following symptoms:  Headache:   Blurred Vision:   Chest Pain:   Palpitations:  Insomnia:   SPECIFY ORAL MEDICATION AND DOSAGE:     If you are taking an injectable medication,  are you experiencing any of the following symptoms:  Bloating: No  Nausea: No  Vomiting:  No  Constipation:  No  Diarrhea: No  SPECIFY INJECTABLE MEDICATION AND CURRENT DOSAGE: Wegovy      Vitals:    Is BP less than 100/60? No  Is BP greater than 140/90? No  Is HR greater than 100? No  **If yes to any of the above, have patient relax and repeat in 5-10 minutes**    Repeat values:    Is BP less than 100/60?  Is BP greater than 140/90?  Is HR greater than 100?  **If values remain outside of ranges above, please consult provider for next steps**

## 2024-02-23 DIAGNOSIS — R41.840 ATTENTION DEFICIT: ICD-10-CM

## 2024-02-23 RX ORDER — DEXTROAMPHETAMINE SACCHARATE, AMPHETAMINE ASPARTATE MONOHYDRATE, DEXTROAMPHETAMINE SULFATE AND AMPHETAMINE SULFATE 5; 5; 5; 5 MG/1; MG/1; MG/1; MG/1
20 CAPSULE, EXTENDED RELEASE ORAL EVERY MORNING
Qty: 30 CAPSULE | Refills: 0 | Status: SHIPPED | OUTPATIENT
Start: 2024-02-23

## 2024-04-01 ENCOUNTER — APPOINTMENT (OUTPATIENT)
Dept: LAB | Age: 29
End: 2024-04-01
Payer: COMMERCIAL

## 2024-04-01 DIAGNOSIS — I10 PRIMARY HYPERTENSION: ICD-10-CM

## 2024-04-01 DIAGNOSIS — E78.5 HYPERLIPIDEMIA, UNSPECIFIED HYPERLIPIDEMIA TYPE: ICD-10-CM

## 2024-04-01 DIAGNOSIS — E55.9 VITAMIN D DEFICIENCY: ICD-10-CM

## 2024-04-01 LAB
25(OH)D3 SERPL-MCNC: 30.9 NG/ML (ref 30–100)
ALBUMIN SERPL BCP-MCNC: 4.1 G/DL (ref 3.5–5)
ALP SERPL-CCNC: 55 U/L (ref 34–104)
ALT SERPL W P-5'-P-CCNC: 7 U/L (ref 7–52)
ANION GAP SERPL CALCULATED.3IONS-SCNC: 8 MMOL/L (ref 4–13)
AST SERPL W P-5'-P-CCNC: 16 U/L (ref 13–39)
BILIRUB SERPL-MCNC: 0.45 MG/DL (ref 0.2–1)
BUN SERPL-MCNC: 13 MG/DL (ref 5–25)
CALCIUM SERPL-MCNC: 8.9 MG/DL (ref 8.4–10.2)
CHLORIDE SERPL-SCNC: 102 MMOL/L (ref 96–108)
CHOLEST SERPL-MCNC: 218 MG/DL
CO2 SERPL-SCNC: 28 MMOL/L (ref 21–32)
CREAT SERPL-MCNC: 1.03 MG/DL (ref 0.6–1.3)
GFR SERPL CREATININE-BSD FRML MDRD: 99 ML/MIN/1.73SQ M
GLUCOSE P FAST SERPL-MCNC: 82 MG/DL (ref 65–99)
HDLC SERPL-MCNC: 41 MG/DL
LDLC SERPL CALC-MCNC: 162 MG/DL (ref 0–100)
POTASSIUM SERPL-SCNC: 4.5 MMOL/L (ref 3.5–5.3)
PROT SERPL-MCNC: 7.3 G/DL (ref 6.4–8.4)
SODIUM SERPL-SCNC: 138 MMOL/L (ref 135–147)
TRIGL SERPL-MCNC: 74 MG/DL

## 2024-04-01 PROCEDURE — 80053 COMPREHEN METABOLIC PANEL: CPT

## 2024-04-01 PROCEDURE — 82306 VITAMIN D 25 HYDROXY: CPT

## 2024-04-01 PROCEDURE — 36415 COLL VENOUS BLD VENIPUNCTURE: CPT

## 2024-04-01 PROCEDURE — 80061 LIPID PANEL: CPT

## 2024-04-03 ENCOUNTER — OFFICE VISIT (OUTPATIENT)
Dept: INTERNAL MEDICINE CLINIC | Facility: OTHER | Age: 29
End: 2024-04-03
Payer: COMMERCIAL

## 2024-04-03 VITALS
OXYGEN SATURATION: 98 % | HEART RATE: 84 BPM | SYSTOLIC BLOOD PRESSURE: 114 MMHG | DIASTOLIC BLOOD PRESSURE: 86 MMHG | WEIGHT: 257 LBS | BODY MASS INDEX: 35.98 KG/M2 | TEMPERATURE: 97.9 F | HEIGHT: 71 IN

## 2024-04-03 DIAGNOSIS — Z13.0 SCREENING FOR DEFICIENCY ANEMIA: ICD-10-CM

## 2024-04-03 DIAGNOSIS — I10 PRIMARY HYPERTENSION: ICD-10-CM

## 2024-04-03 DIAGNOSIS — E78.5 HYPERLIPIDEMIA, UNSPECIFIED HYPERLIPIDEMIA TYPE: ICD-10-CM

## 2024-04-03 DIAGNOSIS — E55.9 VITAMIN D DEFICIENCY: ICD-10-CM

## 2024-04-03 DIAGNOSIS — E66.9 OBESITY (BMI 30-39.9): ICD-10-CM

## 2024-04-03 DIAGNOSIS — R41.840 ATTENTION DEFICIT: Primary | ICD-10-CM

## 2024-04-03 PROBLEM — R05.3 CHRONIC COUGH: Status: RESOLVED | Noted: 2024-02-07 | Resolved: 2024-04-03

## 2024-04-03 PROCEDURE — 99214 OFFICE O/P EST MOD 30 MIN: CPT | Performed by: NURSE PRACTITIONER

## 2024-04-03 RX ORDER — DEXTROAMPHETAMINE SACCHARATE, AMPHETAMINE ASPARTATE MONOHYDRATE, DEXTROAMPHETAMINE SULFATE AND AMPHETAMINE SULFATE 5; 5; 5; 5 MG/1; MG/1; MG/1; MG/1
20 CAPSULE, EXTENDED RELEASE ORAL EVERY MORNING
Qty: 30 CAPSULE | Refills: 0 | Status: SHIPPED | OUTPATIENT
Start: 2024-04-03

## 2024-04-03 NOTE — PROGRESS NOTES
Assessment/Plan:    Problem List Items Addressed This Visit       Attention deficit - Primary     - symptoms controlled on Adderall XR 20mg daily          Relevant Medications    amphetamine-dextroamphetamine (ADDERALL XR, 20MG,) 20 MG 24 hr capsule    Hyperlipidemia     - LDL elevated 162   - discussed lifestyle modifications, diet and exercise  - repeat in 4 months          Relevant Orders    Lipid Panel with Direct LDL reflex    Vitamin D deficiency     - continue current dose of supplement   - vitamin D normal          Obesity (BMI 30-39.9)    Relevant Orders    Comprehensive metabolic panel    Primary hypertension     - BP controlled on losartan 50mg daily           Other Visit Diagnoses       Screening for deficiency anemia        Relevant Orders    CBC and differential                   M*Modal software was used to dictate this note.  It may contain errors with dictating incorrect words or incorrect spelling. Please contact the provider directly with any questions.    Subjective:      Patient ID: Yumi Faria is a 28 y.o. male.    HPI    Patient presents today for 4 month follow up  He continues to follow with bariatrics and continues on Wegovy weekly  Labs completed 4/1  CMP normal     Vitamin D - normal 30.9. he is on 125mcg daily.     Total cholesterol 218, triglycerides 74, HDL 41,   He states his diet has been poor because he is fasting for Ramedan and has been binging when he is able to eat    Overall he feels his ADD is controlled with current dose of adderall. He has not been taking it daily this month due to fasting and being on a different schedule    The following portions of the patient's history were reviewed and updated as appropriate: allergies, current medications, past family history, past medical history, past social history, past surgical history, and problem list.    Review of Systems   Constitutional:  Negative for activity change, appetite change and unexpected weight change.  "  Respiratory:  Negative for chest tightness and shortness of breath.    Cardiovascular:  Negative for chest pain and palpitations.   Psychiatric/Behavioral:  Negative for decreased concentration.          Past Medical History:   Diagnosis Date    ADHD          Current Outpatient Medications:     amphetamine-dextroamphetamine (ADDERALL XR, 20MG,) 20 MG 24 hr capsule, Take 1 capsule (20 mg total) by mouth every morning Max Daily Amount: 20 mg, Disp: 30 capsule, Rfl: 0    ibuprofen (MOTRIN) 600 mg tablet, as needed, Disp: , Rfl:     losartan (COZAAR) 50 mg tablet, Take 1 tablet (50 mg total) by mouth daily, Disp: 90 tablet, Rfl: 1    Semaglutide-Weight Management (Wegovy) 2.4 MG/0.75ML, Inject 0.75 mL (2.4 mg total) under the skin once a week, Disp: 9 mL, Rfl: 0    VITAMIN D PO, Take by mouth, Disp: , Rfl:     No Known Allergies    Social History   Past Surgical History:   Procedure Laterality Date    NO PAST SURGERIES      ROOT CANAL  11/2022     Family History   Problem Relation Age of Onset    Asthma Mother     Hypertension Mother     Heart attack Mother     Coronary artery disease Father     Asthma Brother     Cancer Neg Hx     Diabetes Neg Hx     Stroke Neg Hx     Thyroid disease Neg Hx        Objective:  /86 (BP Location: Left arm, Patient Position: Sitting, Cuff Size: Adult)   Pulse 84   Temp 97.9 °F (36.6 °C) (Temporal)   Ht 5' 11\" (1.803 m)   Wt 117 kg (257 lb)   SpO2 98% Comment: ra  BMI 35.84 kg/m²      Physical Exam  Vitals reviewed.   Constitutional:       General: He is not in acute distress.     Appearance: Normal appearance. He is obese. He is not diaphoretic.   HENT:      Head: Normocephalic and atraumatic.   Eyes:      Extraocular Movements: Extraocular movements intact.      Conjunctiva/sclera: Conjunctivae normal.      Pupils: Pupils are equal, round, and reactive to light.   Cardiovascular:      Rate and Rhythm: Normal rate and regular rhythm.      Heart sounds: Normal heart sounds. No " murmur heard.  Pulmonary:      Effort: Pulmonary effort is normal. No respiratory distress.      Breath sounds: Normal breath sounds. No wheezing, rhonchi or rales.   Neurological:      Mental Status: He is alert and oriented to person, place, and time. Mental status is at baseline.   Psychiatric:         Mood and Affect: Mood normal.         Behavior: Behavior normal.         Thought Content: Thought content normal.         Judgment: Judgment normal.

## 2024-04-03 NOTE — ASSESSMENT & PLAN NOTE
- LDL elevated 162   - discussed lifestyle modifications, diet and exercise  - repeat in 4 months

## 2024-04-17 DIAGNOSIS — E66.9 OBESITY, CLASS II, BMI 35-39.9: ICD-10-CM

## 2024-04-17 DIAGNOSIS — I10 PRIMARY HYPERTENSION: ICD-10-CM

## 2024-04-18 RX ORDER — SEMAGLUTIDE 2.4 MG/.75ML
2.4 INJECTION, SOLUTION SUBCUTANEOUS WEEKLY
Qty: 9 ML | Refills: 0 | Status: SHIPPED | OUTPATIENT
Start: 2024-04-18

## 2024-04-30 ENCOUNTER — OFFICE VISIT (OUTPATIENT)
Dept: BARIATRICS | Facility: CLINIC | Age: 29
End: 2024-04-30
Payer: COMMERCIAL

## 2024-04-30 VITALS
TEMPERATURE: 97.5 F | HEIGHT: 71 IN | HEART RATE: 98 BPM | WEIGHT: 247.2 LBS | BODY MASS INDEX: 34.61 KG/M2 | SYSTOLIC BLOOD PRESSURE: 110 MMHG | DIASTOLIC BLOOD PRESSURE: 78 MMHG

## 2024-04-30 DIAGNOSIS — E66.9 OBESITY, CLASS II, BMI 35-39.9: ICD-10-CM

## 2024-04-30 DIAGNOSIS — I10 PRIMARY HYPERTENSION: ICD-10-CM

## 2024-04-30 DIAGNOSIS — Z91.89 AT RISK FOR SLEEP APNEA: ICD-10-CM

## 2024-04-30 DIAGNOSIS — E66.9 OBESITY (BMI 30-39.9): Primary | ICD-10-CM

## 2024-04-30 PROCEDURE — 3074F SYST BP LT 130 MM HG: CPT | Performed by: NURSE PRACTITIONER

## 2024-04-30 PROCEDURE — 3078F DIAST BP <80 MM HG: CPT | Performed by: NURSE PRACTITIONER

## 2024-04-30 PROCEDURE — 99213 OFFICE O/P EST LOW 20 MIN: CPT | Performed by: NURSE PRACTITIONER

## 2024-04-30 RX ORDER — SEMAGLUTIDE 2.4 MG/.75ML
2.4 INJECTION, SOLUTION SUBCUTANEOUS WEEKLY
Qty: 9 ML | Refills: 0 | Status: SHIPPED | OUTPATIENT
Start: 2024-04-30

## 2024-04-30 NOTE — ASSESSMENT & PLAN NOTE
- Patient is pursuing Conservative Program  - Initial weight loss goal of 5-10% weight loss for improved health-met  - Not interested in weight loss surgery.  - Naltrexone added to Wellbutrin in May 2023, but developed headaches.  - Not currently a candidate for phentermine, as he is taking Adderall.   - Denies history of seizures or glaucoma.   -Medication contract signed April 27, 2023.  - Patient denies personal history of pancreatitis. Patient also denies personal and family history of thyroid cancer and multiple endocrine neoplasia type 2 (MEN 2 tumor).   - Continue Wegovy 2.4 mg weekly, tolerating well and helping with appetite.   - Labs reviewed: Lipid and CMP 4/1/2024. Chol and LDL elevated, which will likely improve with weight loss. Remainder of the blood work within acceptable range.      Initial: 318 lbs BMI 41.96  Last visit: 266.4 lbs BMI 35.15  Current: 247.1 lbs BMI 34.48  Change: -71 lbs (-19 lbs since the last OV)  Goal: 220 lbs      Goals:  Do not skip meals. Can supplement with protein bars or shakes.   Continue food logging.   1700 calories per day.   Keep up the great work with water intake, at least 64 oz of water daily.   Exercise discussed.  Start walking 2 days/week for 10 minutes and gradually increase to a goal of 5 days/week for 30 minutes and 2 days resistance training.  Continue Wegovy.

## 2024-04-30 NOTE — PROGRESS NOTES
Assessment/Plan:     Obesity (BMI 30-39.9)  - Patient is pursuing Conservative Program  - Initial weight loss goal of 5-10% weight loss for improved health-met  - Not interested in weight loss surgery.  - Naltrexone added to Wellbutrin in May 2023, but developed headaches.  - Not currently a candidate for phentermine, as he is taking Adderall.   - Denies history of seizures or glaucoma.   -Medication contract signed April 27, 2023.  - Patient denies personal history of pancreatitis. Patient also denies personal and family history of thyroid cancer and multiple endocrine neoplasia type 2 (MEN 2 tumor).   - Continue Wegovy 2.4 mg weekly, tolerating well and helping with appetite.   - Labs reviewed: Lipid and CMP 4/1/2024. Chol and LDL elevated, which will likely improve with weight loss. Remainder of the blood work within acceptable range.      Initial: 318 lbs BMI 41.96  Last visit: 266.4 lbs BMI 35.15  Current: 247.1 lbs BMI 34.48  Change: -71 lbs (-19 lbs since the last OV)  Goal: 220 lbs      Goals:  Do not skip meals. Can supplement with protein bars or shakes.   Continue food logging.   1700 calories per day.   Keep up the great work with water intake, at least 64 oz of water daily.   Exercise discussed.  Start walking 2 days/week for 10 minutes and gradually increase to a goal of 5 days/week for 30 minutes and 2 days resistance training.  Continue Wegovy.    At risk for sleep apnea  - Advised to follow-up with sleep medicine.     Primary hypertension  - Taking losartan. May improve with weight loss and lifestyle modification. Continue management with prescribing provider.            Yumi was seen today for follow-up.    Diagnoses and all orders for this visit:    Obesity (BMI 30-39.9)    Obesity, Class II, BMI 35-39.9  -     Semaglutide-Weight Management (Wegovy) 2.4 MG/0.75ML; Inject 0.75 mL (2.4 mg total) under the skin once a week    Primary hypertension  -     Semaglutide-Weight Management (Wegovy) 2.4  MG/0.75ML; Inject 0.75 mL (2.4 mg total) under the skin once a week    At risk for sleep apnea              Follow up in approximately  2 month nurse visit and 4 months  with Non-Surgical Physician/Advanced Practitioner.    Subjective:   Chief Complaint   Patient presents with    Follow-up      MWM  4 month F/U  waist  45 in .       Patient ID: Yumi Faria  is a 28 y.o. male with excess weight/obesity here to pursue weight management.  Patient is pursuing Conservative Program.   Most recent notes and records were reviewed.    HPI    Wt Readings from Last 10 Encounters:   04/30/24 112 kg (247 lb 3.2 oz)   04/03/24 117 kg (257 lb)   02/14/24 115 kg (252 lb 12.8 oz)   02/07/24 118 kg (261 lb)   12/14/23 121 kg (266 lb 6.4 oz)   11/30/23 124 kg (274 lb)   10/05/23 130 kg (286 lb 3.2 oz)   09/14/23 133 kg (294 lb)   08/04/23 (!) 139 kg (306 lb)   07/27/23 (!) 139 kg (307 lb)       Taking Wegovy 2.4 mg weekly. No negative side effects and helping with appetite.     Has been food logging. Getting around 8798-7500 calories per day.     Sometimes skips meals.      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     Colonoscopy: N/A         The following portions of the patient's history were reviewed and updated as appropriate: allergies, current medications, past family history, past medical history, past social history, past surgical history, and problem list.    Family History   Problem Relation Age of Onset    Asthma Mother     Hypertension Mother     Heart attack Mother     Coronary artery disease Father     Asthma Brother     Crohn's disease Brother     Cancer Neg Hx     Diabetes Neg Hx     Stroke Neg Hx     Thyroid disease Neg Hx         Review of Systems   HENT:  Negative for sore throat.    Respiratory:  Negative for  "cough and shortness of breath.    Cardiovascular:  Negative for chest pain and palpitations.   Gastrointestinal:  Negative for abdominal pain, constipation, diarrhea, nausea and vomiting.        Denies GERD.   Musculoskeletal:  Negative for arthralgias and back pain.   Skin:  Negative for rash.   Psychiatric/Behavioral:  Negative for suicidal ideas.         Denies anxiety and depression       Objective:  /78   Pulse 98   Temp 97.5 °F (36.4 °C) (Tympanic)   Ht 5' 11\" (1.803 m)   Wt 112 kg (247 lb 3.2 oz)   BMI 34.48 kg/m²     Physical Exam  Vitals and nursing note reviewed.        Constitutional   General appearance: Abnormal.  well developed and obese.   Eyes No conjunctival injection.   Ears, Nose, Mouth, and Throat Oral mucosa moist.   Pulmonary   Respiratory effort: No increased work of breathing or signs of respiratory distress.     Cardiovascular     Examination of extremities for edema and/or varicosities: Normal.  no edema.   Abdomen   Abdomen: Abnormal.  The abdomen was obese.    Musculoskeletal   Normal range of motion  Neurological   Gait and station: Normal.    Psychiatric   Orientation to person, place and time: Normal.    Affect: appropriate       "

## 2024-05-27 DIAGNOSIS — R41.840 ATTENTION DEFICIT: ICD-10-CM

## 2024-05-29 RX ORDER — DEXTROAMPHETAMINE SACCHARATE, AMPHETAMINE ASPARTATE MONOHYDRATE, DEXTROAMPHETAMINE SULFATE AND AMPHETAMINE SULFATE 5; 5; 5; 5 MG/1; MG/1; MG/1; MG/1
20 CAPSULE, EXTENDED RELEASE ORAL EVERY MORNING
Qty: 30 CAPSULE | Refills: 0 | Status: SHIPPED | OUTPATIENT
Start: 2024-05-29

## 2024-06-25 ENCOUNTER — CLINICAL SUPPORT (OUTPATIENT)
Dept: BARIATRICS | Facility: CLINIC | Age: 29
End: 2024-06-25

## 2024-06-25 VITALS
TEMPERATURE: 97.7 F | DIASTOLIC BLOOD PRESSURE: 77 MMHG | BODY MASS INDEX: 34.3 KG/M2 | RESPIRATION RATE: 17 BRPM | HEART RATE: 100 BPM | HEIGHT: 71 IN | SYSTOLIC BLOOD PRESSURE: 100 MMHG | WEIGHT: 245 LBS

## 2024-06-25 DIAGNOSIS — R63.5 ABNORMAL WEIGHT GAIN: Primary | ICD-10-CM

## 2024-06-25 PROCEDURE — RECHECK

## 2024-06-25 NOTE — PROGRESS NOTES
Patient last visit weight:247 lb  Patient current visit weight: 245 lb    If you are taking phentermine or other oral weight loss medications, are you experiencing any of the following symptoms:  Headache:    Blurred Vision:   Chest Pain:   Palpitations:  Insomnia:   SPECIFY ORAL MEDICATION AND DOSAGE:     If you are taking an injectable medication,  are you experiencing any of the following symptoms:  Bloating:  No  Nausea: No  Vomiting:  No  Constipation:  No  Diarrhea: No  SPECIFY INJECTABLE MEDICATION AND CURRENT DOSAGE: Wegovy 2.4 mg      Vitals:    Is BP less than 100/60? No  Is BP greater than 140/90?No  Is HR greater than 100? No  **If yes to any of the above, have patient relax and repeat in 5-10 minutes**    Repeat values:    Is BP less than 100/60?  Is BP greater than 140/90?  Is HR greater than 100?  **If values remain outside of ranges above, please consult provider for next steps**

## 2024-06-26 DIAGNOSIS — R41.840 ATTENTION DEFICIT: ICD-10-CM

## 2024-06-27 RX ORDER — DEXTROAMPHETAMINE SACCHARATE, AMPHETAMINE ASPARTATE MONOHYDRATE, DEXTROAMPHETAMINE SULFATE AND AMPHETAMINE SULFATE 5; 5; 5; 5 MG/1; MG/1; MG/1; MG/1
20 CAPSULE, EXTENDED RELEASE ORAL EVERY MORNING
Qty: 30 CAPSULE | Refills: 0 | Status: SHIPPED | OUTPATIENT
Start: 2024-06-27

## 2024-07-29 DIAGNOSIS — I10 PRIMARY HYPERTENSION: ICD-10-CM

## 2024-07-29 DIAGNOSIS — R41.840 ATTENTION DEFICIT: ICD-10-CM

## 2024-07-29 RX ORDER — LOSARTAN POTASSIUM 50 MG/1
50 TABLET ORAL DAILY
Qty: 90 TABLET | Refills: 1 | Status: SHIPPED | OUTPATIENT
Start: 2024-07-29

## 2024-07-31 RX ORDER — DEXTROAMPHETAMINE SACCHARATE, AMPHETAMINE ASPARTATE MONOHYDRATE, DEXTROAMPHETAMINE SULFATE AND AMPHETAMINE SULFATE 5; 5; 5; 5 MG/1; MG/1; MG/1; MG/1
20 CAPSULE, EXTENDED RELEASE ORAL EVERY MORNING
Qty: 30 CAPSULE | Refills: 0 | Status: SHIPPED | OUTPATIENT
Start: 2024-07-31

## 2024-08-14 ENCOUNTER — APPOINTMENT (OUTPATIENT)
Dept: LAB | Age: 29
End: 2024-08-14
Payer: COMMERCIAL

## 2024-08-14 DIAGNOSIS — E66.9 OBESITY (BMI 30-39.9): ICD-10-CM

## 2024-08-14 DIAGNOSIS — E78.5 HYPERLIPIDEMIA, UNSPECIFIED HYPERLIPIDEMIA TYPE: ICD-10-CM

## 2024-08-14 DIAGNOSIS — Z13.0 SCREENING FOR DEFICIENCY ANEMIA: ICD-10-CM

## 2024-08-14 LAB
ALBUMIN SERPL BCG-MCNC: 3.9 G/DL (ref 3.5–5)
ALP SERPL-CCNC: 49 U/L (ref 34–104)
ALT SERPL W P-5'-P-CCNC: 12 U/L (ref 7–52)
ANION GAP SERPL CALCULATED.3IONS-SCNC: 5 MMOL/L (ref 4–13)
AST SERPL W P-5'-P-CCNC: 15 U/L (ref 13–39)
BASOPHILS # BLD AUTO: 0.03 THOUSANDS/ÂΜL (ref 0–0.1)
BASOPHILS NFR BLD AUTO: 1 % (ref 0–1)
BILIRUB SERPL-MCNC: 0.45 MG/DL (ref 0.2–1)
BUN SERPL-MCNC: 11 MG/DL (ref 5–25)
CALCIUM SERPL-MCNC: 8.7 MG/DL (ref 8.4–10.2)
CHLORIDE SERPL-SCNC: 105 MMOL/L (ref 96–108)
CHOLEST SERPL-MCNC: 211 MG/DL
CO2 SERPL-SCNC: 27 MMOL/L (ref 21–32)
CREAT SERPL-MCNC: 1.03 MG/DL (ref 0.6–1.3)
EOSINOPHIL # BLD AUTO: 0.06 THOUSAND/ÂΜL (ref 0–0.61)
EOSINOPHIL NFR BLD AUTO: 1 % (ref 0–6)
ERYTHROCYTE [DISTWIDTH] IN BLOOD BY AUTOMATED COUNT: 13.6 % (ref 11.6–15.1)
GFR SERPL CREATININE-BSD FRML MDRD: 99 ML/MIN/1.73SQ M
GLUCOSE P FAST SERPL-MCNC: 82 MG/DL (ref 65–99)
HCT VFR BLD AUTO: 45.5 % (ref 36.5–49.3)
HDLC SERPL-MCNC: 45 MG/DL
HGB BLD-MCNC: 14.9 G/DL (ref 12–17)
IMM GRANULOCYTES # BLD AUTO: 0.01 THOUSAND/UL (ref 0–0.2)
IMM GRANULOCYTES NFR BLD AUTO: 0 % (ref 0–2)
LDLC SERPL CALC-MCNC: 149 MG/DL (ref 0–100)
LYMPHOCYTES # BLD AUTO: 1.84 THOUSANDS/ÂΜL (ref 0.6–4.47)
LYMPHOCYTES NFR BLD AUTO: 39 % (ref 14–44)
MCH RBC QN AUTO: 27.8 PG (ref 26.8–34.3)
MCHC RBC AUTO-ENTMCNC: 32.7 G/DL (ref 31.4–37.4)
MCV RBC AUTO: 85 FL (ref 82–98)
MONOCYTES # BLD AUTO: 0.46 THOUSAND/ÂΜL (ref 0.17–1.22)
MONOCYTES NFR BLD AUTO: 10 % (ref 4–12)
NEUTROPHILS # BLD AUTO: 2.37 THOUSANDS/ÂΜL (ref 1.85–7.62)
NEUTS SEG NFR BLD AUTO: 49 % (ref 43–75)
NRBC BLD AUTO-RTO: 0 /100 WBCS
PLATELET # BLD AUTO: 209 THOUSANDS/UL (ref 149–390)
PMV BLD AUTO: 11.2 FL (ref 8.9–12.7)
POTASSIUM SERPL-SCNC: 4.2 MMOL/L (ref 3.5–5.3)
PROT SERPL-MCNC: 7.1 G/DL (ref 6.4–8.4)
RBC # BLD AUTO: 5.36 MILLION/UL (ref 3.88–5.62)
SODIUM SERPL-SCNC: 137 MMOL/L (ref 135–147)
TRIGL SERPL-MCNC: 87 MG/DL
WBC # BLD AUTO: 4.77 THOUSAND/UL (ref 4.31–10.16)

## 2024-08-14 PROCEDURE — 85025 COMPLETE CBC W/AUTO DIFF WBC: CPT

## 2024-08-14 PROCEDURE — 80061 LIPID PANEL: CPT

## 2024-08-14 PROCEDURE — 36415 COLL VENOUS BLD VENIPUNCTURE: CPT

## 2024-08-14 PROCEDURE — 80053 COMPREHEN METABOLIC PANEL: CPT

## 2024-08-15 ENCOUNTER — OFFICE VISIT (OUTPATIENT)
Dept: INTERNAL MEDICINE CLINIC | Facility: OTHER | Age: 29
End: 2024-08-15
Payer: COMMERCIAL

## 2024-08-15 VITALS
WEIGHT: 246.6 LBS | HEART RATE: 111 BPM | TEMPERATURE: 98.2 F | DIASTOLIC BLOOD PRESSURE: 94 MMHG | BODY MASS INDEX: 34.52 KG/M2 | OXYGEN SATURATION: 98 % | HEIGHT: 71 IN | SYSTOLIC BLOOD PRESSURE: 120 MMHG

## 2024-08-15 DIAGNOSIS — I10 PRIMARY HYPERTENSION: Primary | ICD-10-CM

## 2024-08-15 DIAGNOSIS — E55.9 VITAMIN D DEFICIENCY: ICD-10-CM

## 2024-08-15 DIAGNOSIS — E66.9 OBESITY (BMI 30-39.9): ICD-10-CM

## 2024-08-15 DIAGNOSIS — E78.2 MODERATE MIXED HYPERLIPIDEMIA NOT REQUIRING STATIN THERAPY: ICD-10-CM

## 2024-08-15 DIAGNOSIS — R41.840 ATTENTION DEFICIT: ICD-10-CM

## 2024-08-15 PROCEDURE — 99214 OFFICE O/P EST MOD 30 MIN: CPT | Performed by: NURSE PRACTITIONER

## 2024-08-15 NOTE — ASSESSMENT & PLAN NOTE
-He has now lost 95 pounds through his weight loss journey.  50 pound since starting Wegovy.  He continues on Wegovy 2.5 maintenance dose  -Follows with bariatrics

## 2024-08-15 NOTE — ASSESSMENT & PLAN NOTE
Previously controlled on losartan 50 mg daily.  Continue current dose  -Slightly elevated in office today but patient was rushing to get here from work and had coffee prior to his appointment  -Advised patient to message me tonight or tomorrow with home blood pressure reading  -Previous recent readings in bariatric office were 100/77 and 110/78

## 2024-08-15 NOTE — PROGRESS NOTES
Assessment/Plan:    Problem List Items Addressed This Visit       Attention deficit     -Controlled on Adderall XR 20 mg daily  -Follow-up 6 months         Hyperlipidemia     -Improving with diet and exercise  -Repeat in 6 months         Relevant Orders    Lipid Panel with Direct LDL reflex    Comprehensive metabolic panel    Vitamin D deficiency     -Continue vitamin D supplement  -Repeat in 6 months         Relevant Orders    Vitamin D 25 hydroxy    Obesity (BMI 30-39.9)     -He has now lost 95 pounds through his weight loss journey.  50 pound since starting Wegovy.  He continues on Wegovy 2.5 maintenance dose  -Follows with bariatrics         Primary hypertension - Primary     Previously controlled on losartan 50 mg daily.  Continue current dose  -Slightly elevated in office today but patient was rushing to get here from work and had coffee prior to his appointment  -Advised patient to message me tonight or tomorrow with home blood pressure reading  -Previous recent readings in bariatric office were 100/77 and 110/78         Relevant Orders    Comprehensive metabolic panel       BMI Counseling: Body mass index is 34.39 kg/m².        M*[employer] software was used to dictate this note.  It may contain errors with dictating incorrect words or incorrect spelling. Please contact the provider directly with any questions.    Subjective:      Patient ID: Yumi Faria is a 28 y.o. male.    HPI    Patient presents today for routine follow up  Labs completed 8/14  CBC and CMP normal     Total cholesterol 211, triglycerides 87, HDL 45,     HTN - he is on losartan 50mg daily. He checks his BP infrequently at home. Home readings a 125/85     He continues on Wegovy. He is following with bariatrics. He has lost 95 lbs.     ADD- he is well controlled on Adderall XR 20mg daily. He feels well controlled o this dose when he takes it regulalrly he feels he is functioning well. When he misses a dose he can tell something is  "off    The following portions of the patient's history were reviewed and updated as appropriate: allergies, current medications, past family history, past medical history, past social history, past surgical history, and problem list.    Review of Systems   Constitutional:  Negative for activity change, appetite change and unexpected weight change.   Respiratory:  Negative for chest tightness and shortness of breath.    Cardiovascular:  Negative for chest pain and palpitations.   Psychiatric/Behavioral:  The patient is not nervous/anxious.          Past Medical History:   Diagnosis Date    ADHD          Current Outpatient Medications:     amphetamine-dextroamphetamine (ADDERALL XR, 20MG,) 20 MG 24 hr capsule, Take 1 capsule (20 mg total) by mouth every morning Max Daily Amount: 20 mg, Disp: 30 capsule, Rfl: 0    ibuprofen (MOTRIN) 600 mg tablet, as needed, Disp: , Rfl:     losartan (COZAAR) 50 mg tablet, Take 1 tablet (50 mg total) by mouth daily, Disp: 90 tablet, Rfl: 1    Semaglutide-Weight Management (Wegovy) 2.4 MG/0.75ML, Inject 0.75 mL (2.4 mg total) under the skin once a week, Disp: 9 mL, Rfl: 0    VITAMIN D PO, Take by mouth, Disp: , Rfl:     No Known Allergies    Social History   Past Surgical History:   Procedure Laterality Date    NO PAST SURGERIES      ROOT CANAL  11/2022     Family History   Problem Relation Age of Onset    Asthma Mother     Hypertension Mother     Heart attack Mother     Coronary artery disease Father     Asthma Brother     Crohn's disease Brother     Cancer Neg Hx     Diabetes Neg Hx     Stroke Neg Hx     Thyroid disease Neg Hx        Objective:  /94 (BP Location: Left arm, Patient Position: Sitting, Cuff Size: Standard)   Pulse (!) 111   Temp 98.2 °F (36.8 °C) (Temporal)   Ht 5' 11\" (1.803 m)   Wt 112 kg (246 lb 9.6 oz)   SpO2 98%   BMI 34.39 kg/m²      Physical Exam  Vitals reviewed.   Constitutional:       General: He is not in acute distress.     Appearance: Normal " appearance. He is obese. He is not diaphoretic.   HENT:      Head: Normocephalic and atraumatic.   Eyes:      Extraocular Movements: Extraocular movements intact.      Conjunctiva/sclera: Conjunctivae normal.      Pupils: Pupils are equal, round, and reactive to light.   Cardiovascular:      Rate and Rhythm: Regular rhythm. Tachycardia present.      Heart sounds: No murmur heard.  Pulmonary:      Effort: Pulmonary effort is normal. No respiratory distress.      Breath sounds: Normal breath sounds. No wheezing, rhonchi or rales.   Neurological:      Mental Status: He is alert and oriented to person, place, and time. Mental status is at baseline.   Psychiatric:         Mood and Affect: Mood is anxious.         Behavior: Behavior normal.         Thought Content: Thought content normal.         Judgment: Judgment normal.

## 2024-08-16 ENCOUNTER — DOCUMENTATION (OUTPATIENT)
Dept: INTERNAL MEDICINE CLINIC | Facility: OTHER | Age: 29
End: 2024-08-16

## 2024-08-22 VITALS — SYSTOLIC BLOOD PRESSURE: 109 MMHG | DIASTOLIC BLOOD PRESSURE: 77 MMHG

## 2024-08-31 DIAGNOSIS — R41.840 ATTENTION DEFICIT: ICD-10-CM

## 2024-09-04 RX ORDER — DEXTROAMPHETAMINE SACCHARATE, AMPHETAMINE ASPARTATE MONOHYDRATE, DEXTROAMPHETAMINE SULFATE AND AMPHETAMINE SULFATE 5; 5; 5; 5 MG/1; MG/1; MG/1; MG/1
20 CAPSULE, EXTENDED RELEASE ORAL EVERY MORNING
Qty: 30 CAPSULE | Refills: 0 | Status: SHIPPED | OUTPATIENT
Start: 2024-09-04

## 2024-09-13 ENCOUNTER — PATIENT MESSAGE (OUTPATIENT)
Dept: INTERNAL MEDICINE CLINIC | Age: 29
End: 2024-09-13

## 2024-09-13 ENCOUNTER — TELEPHONE (OUTPATIENT)
Age: 29
End: 2024-09-13

## 2024-09-13 DIAGNOSIS — R41.840 ATTENTION DEFICIT: Primary | ICD-10-CM

## 2024-09-13 NOTE — TELEPHONE ENCOUNTER
Pt states he has been trying to get his amphetamine-dextroamphetamine (ADDERALL XR, 20MG,) filled for 2 weeks with no luck. His pharmacy is on back order. Pt would like to know If there is an alternative or a different dosage he can take . Please advise

## 2024-09-17 DIAGNOSIS — I10 PRIMARY HYPERTENSION: ICD-10-CM

## 2024-09-17 DIAGNOSIS — E66.9 OBESITY, CLASS II, BMI 35-39.9: ICD-10-CM

## 2024-09-17 RX ORDER — SEMAGLUTIDE 2.4 MG/.75ML
2.4 INJECTION, SOLUTION SUBCUTANEOUS WEEKLY
Qty: 9 ML | Refills: 0 | Status: SHIPPED | OUTPATIENT
Start: 2024-09-17

## 2024-09-17 NOTE — TELEPHONE ENCOUNTER
Reason for call:   [x] Refill   [] Prior Auth  [] Other:     Office:   [] PCP/Provider -   [x] Specialty/Provider - Weight management     Medication: Wegovy 2.4 mg, inject 0.75 mL under the skin once a week     Pharmacy: [facility]     Does the patient have enough for 3 days?   [x] Yes   [] No - Send as HP to POD

## 2024-09-19 ENCOUNTER — TELEPHONE (OUTPATIENT)
Age: 29
End: 2024-09-19

## 2024-09-19 RX ORDER — DEXTROAMPHETAMINE SACCHARATE, AMPHETAMINE ASPARTATE, DEXTROAMPHETAMINE SULFATE AND AMPHETAMINE SULFATE 2.5; 2.5; 2.5; 2.5 MG/1; MG/1; MG/1; MG/1
10 TABLET ORAL
Qty: 60 TABLET | Refills: 0 | Status: SHIPPED | OUTPATIENT
Start: 2024-09-19

## 2024-09-19 NOTE — TELEPHONE ENCOUNTER
PA for amphetamine-dextroamphetamine (ADDERALL, 10MG,) 10 mg tablet SUBMITTED     via    []CMM-KEY:    [x]Surescripts-Case ID # [id number]   []Faxed to plan   []Other website    []Phone call Case ID #      Office notes sent, clinical questions answered. Awaiting determination    Turnaround time for your insurance to make a decision on your Prior Authorization can take 7-21 business days.

## 2024-09-24 NOTE — TELEPHONE ENCOUNTER
PA for amphetamine-dextroamphetamine (ADDERALL, 10MG,) 10 mg tablet APPROVED     Date(s) approved 9/19/24 - 09/19/27    Case #[id number]     Patient advised by          [][place]ur[place] Message  []Phone call   [x]LMOM  []L/M to call office as no active Communication consent on file  []Unable to leave detailed message as VM not approved on Communication consent       Pharmacy advised by    [x]Fax  []Phone call    Approval letter scanned into Media Yes

## 2024-10-22 ENCOUNTER — TELEPHONE (OUTPATIENT)
Dept: BARIATRICS | Facility: CLINIC | Age: 29
End: 2024-10-22

## 2024-10-22 NOTE — TELEPHONE ENCOUNTER
Called pt to inform them that there medications was sent over the pharmacy, and If he wanted to give the pharmacy a call.

## 2024-10-25 ENCOUNTER — TELEPHONE (OUTPATIENT)
Dept: BARIATRICS | Facility: CLINIC | Age: 29
End: 2024-10-25

## 2024-10-28 ENCOUNTER — TELEPHONE (OUTPATIENT)
Dept: BARIATRICS | Facility: CLINIC | Age: 29
End: 2024-10-28

## 2024-10-29 ENCOUNTER — TELEPHONE (OUTPATIENT)
Dept: BARIATRICS | Facility: CLINIC | Age: 29
End: 2024-10-29

## 2024-10-29 ENCOUNTER — CLINICAL SUPPORT (OUTPATIENT)
Dept: BARIATRICS | Facility: CLINIC | Age: 29
End: 2024-10-29

## 2024-10-29 VITALS
HEIGHT: 71 IN | WEIGHT: 245 LBS | HEART RATE: 98 BPM | TEMPERATURE: 98 F | RESPIRATION RATE: 17 BRPM | SYSTOLIC BLOOD PRESSURE: 118 MMHG | BODY MASS INDEX: 34.3 KG/M2 | DIASTOLIC BLOOD PRESSURE: 80 MMHG

## 2024-10-29 DIAGNOSIS — R63.5 ABNORMAL WEIGHT GAIN: Primary | ICD-10-CM

## 2024-10-29 DIAGNOSIS — I10 PRIMARY HYPERTENSION: ICD-10-CM

## 2024-10-29 DIAGNOSIS — E66.812 OBESITY, CLASS II, BMI 35-39.9: ICD-10-CM

## 2024-10-29 PROCEDURE — RECHECK

## 2024-10-29 RX ORDER — SEMAGLUTIDE 2.4 MG/.75ML
2.4 INJECTION, SOLUTION SUBCUTANEOUS WEEKLY
Qty: 9 ML | Refills: 0 | Status: SHIPPED | OUTPATIENT
Start: 2024-10-29 | End: 2024-11-05 | Stop reason: SDUPTHER

## 2024-10-29 NOTE — PROGRESS NOTES
Patient last visit weight: 245lbs   Patient current visit weight: 245.0lbs     If you are taking phentermine or other oral weight loss medications, are you experiencing any of the following symptoms:  Headache:   Blurred Vision:   Chest Pain:   Palpitations:  Insomnia:   SPECIFY ORAL MEDICATION AND DOSAGE:     If you are taking an injectable medication,  are you experiencing any of the following symptoms:  Bloating: NO   Nausea: NO   Vomiting: NO   Constipation: NO   Diarrhea: NO   SPECIFY INJECTABLE MEDICATION AND CURRENT DOSAGE: Wegovy 2.4mg. Patient requesting a refill. Will need prior auth complete since his .       Vitals:    Is BP less than 100/60? NO   Is BP greater than 140/90? NO   Is HR greater than 100? NO   **If yes to any of the above, have patient relax and repeat in 5-10 minutes**    Repeat values:    Is BP less than 100/60?  Is BP greater than 140/90?  Is HR greater than 100?  **If values remain outside of ranges above, please consult provider for next steps**

## 2024-10-29 NOTE — TELEPHONE ENCOUNTER
PA for Wegovy 2.4mg SUBMITTED     via    [x]CMM-KEY: [id number]  []Surescripts-Case ID #    []Availity-Auth ID #  NDC #    []Faxed to plan   []Other website    []Phone call Case ID #      Office notes sent, clinical questions answered. Awaiting determination    Turnaround time for your insurance to make a decision on your Prior Authorization can take 7-21 business days.

## 2024-10-30 NOTE — TELEPHONE ENCOUNTER
PA for Wegovy 2.4mg APPEALED via     []CMM  []SS  []Letter sent to insurance via fax [phone]  []Other site or means      All necessary records sent. Will await response from insurance company    Turnaround time for a decision to be made on an appeal could take up to 30 business days     Scanned insurance letter in media

## 2024-11-05 ENCOUNTER — OFFICE VISIT (OUTPATIENT)
Age: 29
End: 2024-11-05

## 2024-11-05 VITALS
HEART RATE: 81 BPM | DIASTOLIC BLOOD PRESSURE: 80 MMHG | SYSTOLIC BLOOD PRESSURE: 120 MMHG | BODY MASS INDEX: 32.44 KG/M2 | WEIGHT: 244.8 LBS | TEMPERATURE: 97.6 F | HEIGHT: 73 IN

## 2024-11-05 DIAGNOSIS — E66.812 OBESITY, CLASS II, BMI 35-39.9: ICD-10-CM

## 2024-11-05 DIAGNOSIS — I10 PRIMARY HYPERTENSION: ICD-10-CM

## 2024-11-05 RX ORDER — SEMAGLUTIDE 2.4 MG/.75ML
2.4 INJECTION, SOLUTION SUBCUTANEOUS WEEKLY
Qty: 2 ML | Refills: 5 | Status: SHIPPED | OUTPATIENT
Start: 2024-11-05

## 2024-11-05 NOTE — PROGRESS NOTES
Assessment/Plan:     Initial: 318 lbs BMI 41.96 (4/13/23)  Last visit:  247.1 lbs BMI 34.48 (4/30/24)  Current: 244 lbs BMI 32.3 (11/5/24)   Change:  -74 lbs  Goal: 220 lbs    - Weight not at goal  - Patient is interested in Conservative Program  - Labs reviewed: As below.    General Recommendations:  Nutrition:  Eat breakfast daily.  Do not skip meals.     Food log (ie.) www.myfitnesspal.com, sparkpeople.com, loseit.com, calorieking.com, etc.    Practice mindful eating.  Be sure to set aside time to eat, eat slowly, and savor your food.    Hydration:    At least 64oz of water daily.  No sugar sweetened beverages.  No juice (eat the fruit instead).    Exercise:  Studies have shown that the ideal exercise goal is somewhere between 150 to 300 minutes of moderate intensity exercise a week.  Start with exercising 10 minutes every other day and gradually increase physical activity with a goal of at least 150 minutes of moderate intensity exercise a week, divided over at least 3 days a week.  An example of this would be exercising 30 minutes a day, 5 days a week.  Resistance training can increase muscle mass and increase our resting metabolic rate.   FULL BODY resistance training is recommended 2-3 times a week.  Do not do this on consecutive days to allow for muscle recovery.    Aim for a bare minimum 5000 steps, even on days you do not exercise.    Monitoring:   Weigh yourself daily.  If this causes undue stress, then just weigh yourself once a week.  Weigh yourself the same time of the day with the same amount of clothing on.  Preferably this should be done after waking up, before you eat, and with no clothing or minimal clothing on.    Calorie goal:  2000 annie/day    Return visit:    Calorie tracking/deficit - coordinate RD menu planning session  Physical activity - increase with walking & resistance training  AOM  Will continue wegovy 2.4 mg  If plateau continued despite dietary modficiations, consider switching to  zepbound  RTC:  4 months     Subjective:   Chief Complaint   Patient presents with    Consult     Mwm 4mth f/u       Patient ID: Yumi Faria  is a 29 y.o. male with excess weight/obesity here to pursue weight management.  Patient is pursuing Conservative Program.   Most recent notes and records were reviewed.    HPI    Wt Readings from Last 10 Encounters:   11/05/24 111 kg (244 lb 12.8 oz)   10/29/24 111 kg (245 lb)   08/15/24 112 kg (246 lb 9.6 oz)   06/25/24 111 kg (245 lb)   04/30/24 112 kg (247 lb 3.2 oz)   04/03/24 117 kg (257 lb)   02/14/24 115 kg (252 lb 12.8 oz)   02/07/24 118 kg (261 lb)   12/14/23 121 kg (266 lb 6.4 oz)   11/30/23 124 kg (274 lb)       Taking Wegovy 2.4 mg weekly. No negative side effects and helping with appetite & early satiety.   He does feel like his weight has plateaued.     Has not been food logging as routinely as before.     Sometimes skips meals.      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     Colonoscopy: N/A         The following portions of the patient's history were reviewed and updated as appropriate: allergies, current medications, past family history, past medical history, past social history, past surgical history, and problem list.    Family History   Problem Relation Age of Onset    Asthma Mother     Hypertension Mother     Heart attack Mother     Coronary artery disease Father     Asthma Brother     Crohn's disease Brother     Cancer Neg Hx     Diabetes Neg Hx     Stroke Neg Hx     Thyroid disease Neg Hx         Review of Systems   HENT:  Negative for sore throat.    Respiratory:  Negative for cough and shortness of breath.    Cardiovascular:  Negative for chest pain and palpitations.   Gastrointestinal:  Negative for abdominal pain, constipation, diarrhea,  "nausea and vomiting.        Denies GERD.   Musculoskeletal:  Negative for arthralgias and back pain.   Skin:  Negative for rash.   Psychiatric/Behavioral:  Negative for suicidal ideas.         Denies anxiety and depression       Objective:  /80   Pulse 81   Temp 97.6 °F (36.4 °C) (Tympanic)   Ht 6' 1\" (1.854 m)   Wt 111 kg (244 lb 12.8 oz)   BMI 32.30 kg/m²     Physical Exam  Vitals and nursing note reviewed.        Constitutional   General appearance: Abnormal.  well developed and obese.   Eyes No conjunctival injection.   Ears, Nose, Mouth, and Throat Oral mucosa moist.   Pulmonary   Respiratory effort: No increased work of breathing or signs of respiratory distress.     Cardiovascular     Examination of extremities for edema and/or varicosities: Normal.  no edema.   Abdomen   Abdomen: Abnormal.  The abdomen was obese.    Musculoskeletal   Normal range of motion  Neurological   Gait and station: Normal.    Psychiatric   Orientation to person, place and time: Normal.    Affect: appropriate       "

## 2024-11-13 DIAGNOSIS — R41.840 ATTENTION DEFICIT: Primary | ICD-10-CM

## 2024-11-14 RX ORDER — DEXTROAMPHETAMINE SACCHARATE, AMPHETAMINE ASPARTATE MONOHYDRATE, DEXTROAMPHETAMINE SULFATE AND AMPHETAMINE SULFATE 5; 5; 5; 5 MG/1; MG/1; MG/1; MG/1
20 CAPSULE, EXTENDED RELEASE ORAL EVERY MORNING
Qty: 30 CAPSULE | Refills: 0 | Status: SHIPPED | OUTPATIENT
Start: 2024-11-14

## 2024-11-26 ENCOUNTER — TELEPHONE (OUTPATIENT)
Dept: BARIATRICS | Facility: CLINIC | Age: 29
End: 2024-11-26

## 2024-11-26 NOTE — TELEPHONE ENCOUNTER
Patient return call from office. Warm transferred to [facility] for further assistance.

## 2024-11-26 NOTE — TELEPHONE ENCOUNTER
LVM to see what insurance patient has. If patient has the commercial insurance we need the address from the back of the card. Left office info to call back [phone]

## 2024-12-14 DIAGNOSIS — R41.840 ATTENTION DEFICIT: ICD-10-CM

## 2024-12-18 RX ORDER — DEXTROAMPHETAMINE SACCHARATE, AMPHETAMINE ASPARTATE MONOHYDRATE, DEXTROAMPHETAMINE SULFATE AND AMPHETAMINE SULFATE 5; 5; 5; 5 MG/1; MG/1; MG/1; MG/1
20 CAPSULE, EXTENDED RELEASE ORAL EVERY MORNING
Qty: 30 CAPSULE | Refills: 0 | Status: SHIPPED | OUTPATIENT
Start: 2024-12-18

## 2025-01-09 ENCOUNTER — OFFICE VISIT (OUTPATIENT)
Dept: INTERNAL MEDICINE CLINIC | Facility: OTHER | Age: 30
End: 2025-01-09
Payer: COMMERCIAL

## 2025-01-09 VITALS
HEIGHT: 73 IN | DIASTOLIC BLOOD PRESSURE: 80 MMHG | OXYGEN SATURATION: 99 % | SYSTOLIC BLOOD PRESSURE: 118 MMHG | TEMPERATURE: 98.3 F | WEIGHT: 252 LBS | HEART RATE: 97 BPM | BODY MASS INDEX: 33.4 KG/M2

## 2025-01-09 DIAGNOSIS — E66.811 CLASS 1 OBESITY DUE TO EXCESS CALORIES WITH SERIOUS COMORBIDITY AND BODY MASS INDEX (BMI) OF 33.0 TO 33.9 IN ADULT: ICD-10-CM

## 2025-01-09 DIAGNOSIS — I10 PRIMARY HYPERTENSION: Primary | ICD-10-CM

## 2025-01-09 DIAGNOSIS — E55.9 VITAMIN D DEFICIENCY: ICD-10-CM

## 2025-01-09 DIAGNOSIS — E78.2 MODERATE MIXED HYPERLIPIDEMIA NOT REQUIRING STATIN THERAPY: ICD-10-CM

## 2025-01-09 DIAGNOSIS — E66.09 CLASS 1 OBESITY DUE TO EXCESS CALORIES WITH SERIOUS COMORBIDITY AND BODY MASS INDEX (BMI) OF 33.0 TO 33.9 IN ADULT: ICD-10-CM

## 2025-01-09 DIAGNOSIS — R41.840 ATTENTION DEFICIT: ICD-10-CM

## 2025-01-09 PROCEDURE — 99214 OFFICE O/P EST MOD 30 MIN: CPT | Performed by: NURSE PRACTITIONER

## 2025-01-09 NOTE — PROGRESS NOTES
Assessment/Plan:    Problem List Items Addressed This Visit       Attention deficit    Controlled on Adderall XR 20 mg daily         Hyperlipidemia    Continue healthy diet Encouraged to start routine exercise regimen  Update lipid panel, will message patient with results         Vitamin D deficiency    Currently on vitamin D3 2000 IU daily  Update vitamin D level         Class 1 obesity due to excess calories with serious comorbidity and body mass index (BMI) of 33.0 to 33.9 in adult    Following with bariatrics on Wegovy  Encouraged to start routine exercise regimen         Primary hypertension - Primary    Blood pressure well-controlled on losartan 50 mg daily          M*Modal software was used to dictate this note.  It may contain errors with dictating incorrect words or incorrect spelling. Please contact the provider directly with any questions.    Subjective:      Patient ID: Yumi Faria is a 29 y.o. male.    HPI    Patient presents today for 4 month follow up  He continues to follow with bariatrics. He continues on wegovy, his weight has been stagnant and he is not exercising. 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 He has to travel a lot for his job.  It is hard for him to get into a routine of exercise with his new job.     BP is well controlled on losartan 50mg daily.  Compliant with medication, denies any adverse side effects.     ADD - controlled on Adderall XR 20mg daily.     The following portions of the patient's history were reviewed and updated as appropriate: allergies, current medications, past family history, past medical history, past social history, past surgical history, and problem list.    Review of Systems   Constitutional:  Negative for activity change, appetite change, fever and unexpected weight change.   Respiratory:  Negative for chest tightness and shortness of breath.    Cardiovascular:  Negative for chest pain and palpitations.         Past Medical  "History:   Diagnosis Date    ADHD          Current Outpatient Medications:     amphetamine-dextroamphetamine (ADDERALL XR, 20MG,) 20 MG 24 hr capsule, Take 1 capsule (20 mg total) by mouth every morning Max Daily Amount: 20 mg, Disp: 30 capsule, Rfl: 0    ibuprofen (MOTRIN) 600 mg tablet, as needed, Disp: , Rfl:     losartan (COZAAR) 50 mg tablet, Take 1 tablet (50 mg total) by mouth daily, Disp: 90 tablet, Rfl: 1    Semaglutide-Weight Management (Wegovy) 2.4 MG/0.75ML, Inject 0.75 mL (2.4 mg total) under the skin once a week, Disp: 2 mL, Rfl: 5    VITAMIN D PO, Take by mouth, Disp: , Rfl:     No Known Allergies    Social History   Past Surgical History:   Procedure Laterality Date    NO PAST SURGERIES      ROOT CANAL  11/2022     Family History   Problem Relation Age of Onset    Asthma Mother     Hypertension Mother     Heart attack Mother     Coronary artery disease Father     Asthma Brother     Crohn's disease Brother     Cancer Neg Hx     Diabetes Neg Hx     Stroke Neg Hx     Thyroid disease Neg Hx        Objective:  /80 (BP Location: Left arm, Patient Position: Sitting, Cuff Size: Large)   Pulse 97   Temp 98.3 °F (36.8 °C)   Ht 6' 1\" (1.854 m)   Wt 114 kg (252 lb)   SpO2 99%   BMI 33.25 kg/m²      Physical Exam  Vitals reviewed.   Constitutional:       General: He is not in acute distress.     Appearance: Normal appearance. He is obese. He is not diaphoretic.   HENT:      Head: Normocephalic and atraumatic.   Eyes:      Extraocular Movements: Extraocular movements intact.      Conjunctiva/sclera: Conjunctivae normal.      Pupils: Pupils are equal, round, and reactive to light.   Cardiovascular:      Rate and Rhythm: Normal rate and regular rhythm.      Heart sounds: Normal heart sounds. No murmur heard.  Pulmonary:      Effort: Pulmonary effort is normal. No respiratory distress.      Breath sounds: Normal breath sounds. No wheezing, rhonchi or rales.   Neurological:      Mental Status: He is alert " and oriented to person, place, and time. Mental status is at baseline.   Psychiatric:         Mood and Affect: Mood normal.         Behavior: Behavior normal.         Thought Content: Thought content normal.         Judgment: Judgment normal.

## 2025-01-09 NOTE — ASSESSMENT & PLAN NOTE
Continue healthy diet Encouraged to start routine exercise regimen  Update lipid panel, will message patient with results

## 2025-02-04 DIAGNOSIS — R41.840 ATTENTION DEFICIT: ICD-10-CM

## 2025-02-05 RX ORDER — DEXTROAMPHETAMINE SACCHARATE, AMPHETAMINE ASPARTATE MONOHYDRATE, DEXTROAMPHETAMINE SULFATE AND AMPHETAMINE SULFATE 5; 5; 5; 5 MG/1; MG/1; MG/1; MG/1
20 CAPSULE, EXTENDED RELEASE ORAL EVERY MORNING
Qty: 30 CAPSULE | Refills: 0 | Status: SHIPPED | OUTPATIENT
Start: 2025-02-05

## 2025-03-11 DIAGNOSIS — R41.840 ATTENTION DEFICIT: ICD-10-CM

## 2025-03-12 RX ORDER — DEXTROAMPHETAMINE SACCHARATE, AMPHETAMINE ASPARTATE MONOHYDRATE, DEXTROAMPHETAMINE SULFATE AND AMPHETAMINE SULFATE 5; 5; 5; 5 MG/1; MG/1; MG/1; MG/1
20 CAPSULE, EXTENDED RELEASE ORAL EVERY MORNING
Qty: 30 CAPSULE | Refills: 0 | Status: SHIPPED | OUTPATIENT
Start: 2025-03-12

## 2025-03-18 ENCOUNTER — OFFICE VISIT (OUTPATIENT)
Dept: BARIATRICS | Facility: CLINIC | Age: 30
End: 2025-03-18
Payer: COMMERCIAL

## 2025-03-18 VITALS
WEIGHT: 249.8 LBS | RESPIRATION RATE: 16 BRPM | DIASTOLIC BLOOD PRESSURE: 83 MMHG | HEART RATE: 81 BPM | TEMPERATURE: 97.8 F | SYSTOLIC BLOOD PRESSURE: 110 MMHG | BODY MASS INDEX: 33.11 KG/M2 | HEIGHT: 73 IN

## 2025-03-18 DIAGNOSIS — G47.33 OSA (OBSTRUCTIVE SLEEP APNEA): ICD-10-CM

## 2025-03-18 DIAGNOSIS — E66.812 OBESITY, CLASS II, BMI 35-39.9: ICD-10-CM

## 2025-03-18 DIAGNOSIS — E66.811 CLASS 1 OBESITY DUE TO EXCESS CALORIES WITH SERIOUS COMORBIDITY AND BODY MASS INDEX (BMI) OF 32.0 TO 32.9 IN ADULT: Primary | ICD-10-CM

## 2025-03-18 DIAGNOSIS — I10 PRIMARY HYPERTENSION: ICD-10-CM

## 2025-03-18 DIAGNOSIS — E66.09 CLASS 1 OBESITY DUE TO EXCESS CALORIES WITH SERIOUS COMORBIDITY AND BODY MASS INDEX (BMI) OF 32.0 TO 32.9 IN ADULT: Primary | ICD-10-CM

## 2025-03-18 PROCEDURE — 99214 OFFICE O/P EST MOD 30 MIN: CPT | Performed by: PHYSICIAN ASSISTANT

## 2025-03-18 RX ORDER — SEMAGLUTIDE 2.4 MG/.75ML
2.4 INJECTION, SOLUTION SUBCUTANEOUS WEEKLY
Qty: 2 ML | Refills: 6 | Status: SHIPPED | OUTPATIENT
Start: 2025-03-18

## 2025-03-18 NOTE — PROGRESS NOTES
Assessment/Plan:    Class 1 obesity due to excess calories with serious comorbidity and body mass index (BMI) of 32.0 to 32.9 in adult  - Patient is pursuing Conservative Program  - Initial weight loss goal of 5-10% weight loss for improved health-met  - Labs reviewed: Lipid and CMP 8/14/2024. Chol and LDL elevated, which will likely improve with weight loss. Remainder of the blood work within acceptable range.    On wegovy 2.4 mg and doing well.  He has hit a plateau.  Discussed about making dietary changes. Would start with planning and eating lunch and dinner and then trying to modify snack options. Also discussed regular exercise and making it more routing.  To continue with water intake  Past medications:Naltrexone added to Wellbutrin in May 2023, but developed headaches.  - Not currently a candidate for phentermine, as he is taking Adderall.   -Medication contract signed April 27, 2023.      Initial: 318 lbs BMI 41.96  Last visit: 247.1  Current: 249.8  Change: -68.2 lbs (+2.7 lbs since the last OV)  Goal: 220 lbs      ADRIEN (obstructive sleep apnea)  -should improve with weight loss, dietary, and lifestyle changes      Primary hypertension  On losartan 50mg  -should improve with weight loss, dietary, and lifestyle changes        Return in about 6 months (around 9/18/2025) for RD visit .       Diagnoses and all orders for this visit:    Class 1 obesity due to excess calories with serious comorbidity and body mass index (BMI) of 32.0 to 32.9 in adult    Obesity, Class II, BMI 35-39.9  -     Semaglutide-Weight Management (Wegovy) 2.4 MG/0.75ML; Inject 0.75 mL (2.4 mg total) under the skin once a week    Primary hypertension  -     Semaglutide-Weight Management (Wegovy) 2.4 MG/0.75ML; Inject 0.75 mL (2.4 mg total) under the skin once a week    ADRIEN (obstructive sleep apnea)          Subjective:   Chief Complaint   Patient presents with    Follow-up     MWM-4M F/u; Waist-45.5in        Patient ID: Yumi Faria  is a 29  y.o. male with excess weight/obesity here to pursue weight managment.  Patient is pursuing Conservative Program.     HPI  On wegovy 2.4mg . He is feeling full with the medication.  Now doing more snacking .  He is traveling more for work so it hasmade it harder to be consistent  Wt Readings from Last 10 Encounters:   03/18/25 113 kg (249 lb 12.8 oz)   01/09/25 114 kg (252 lb)   11/05/24 111 kg (244 lb 12.8 oz)   10/29/24 111 kg (245 lb)   08/15/24 112 kg (246 lb 9.6 oz)   06/25/24 111 kg (245 lb)   04/30/24 112 kg (247 lb 3.2 oz)   04/03/24 117 kg (257 lb)   02/14/24 115 kg (252 lb 12.8 oz)   02/07/24 118 kg (261 lb)       Food logging:inconsistent, 1900 calories  Increased appetite/cravings:more snacking  Exercise:walking a few times a week  Hydration:coffee in the AM, water 5-6 16 oz bottles      The following portions of the patient's history were reviewed and updated as appropriate: He  has a past medical history of ADHD.  He   Patient Active Problem List    Diagnosis Date Noted    ADRIEN (obstructive sleep apnea)     Snoring 09/14/2023    Tonsillar hypertrophy 09/14/2023    At risk for sleep apnea 04/27/2023    Primary hypertension 01/05/2023    Palpitations 01/05/2023    Family history of premature CAD 01/05/2023    Acid reflux 03/13/2020    Class 1 obesity due to excess calories with serious comorbidity and body mass index (BMI) of 32.0 to 32.9 in adult 06/28/2019    Hyperlipidemia 12/23/2015    Vitamin D deficiency 11/27/2015    Attention deficit 11/25/2015     He  has a past surgical history that includes No past surgeries and Root canal (11/2022).  His family history includes Asthma in his brother and mother; Coronary artery disease in his father; Crohn's disease in his brother; Heart attack in his mother; Hypertension in his mother.  He  reports that he has never smoked. He has never used smokeless tobacco. He reports that he does not drink alcohol and does not use drugs.  Current Outpatient Medications  "  Medication Sig Dispense Refill    amphetamine-dextroamphetamine (ADDERALL XR, 20MG,) 20 MG 24 hr capsule Take 1 capsule (20 mg total) by mouth every morning Max Daily Amount: 20 mg 30 capsule 0    ibuprofen (MOTRIN) 600 mg tablet as needed      losartan (COZAAR) 50 mg tablet Take 1 tablet (50 mg total) by mouth daily 90 tablet 1    Semaglutide-Weight Management (Wegovy) 2.4 MG/0.75ML Inject 0.75 mL (2.4 mg total) under the skin once a week 2 mL 6    VITAMIN D PO Take by mouth       No current facility-administered medications for this visit.     Current Outpatient Medications on File Prior to Visit   Medication Sig    amphetamine-dextroamphetamine (ADDERALL XR, 20MG,) 20 MG 24 hr capsule Take 1 capsule (20 mg total) by mouth every morning Max Daily Amount: 20 mg    ibuprofen (MOTRIN) 600 mg tablet as needed    losartan (COZAAR) 50 mg tablet Take 1 tablet (50 mg total) by mouth daily    VITAMIN D PO Take by mouth    [DISCONTINUED] Semaglutide-Weight Management (Wegovy) 2.4 MG/0.75ML Inject 0.75 mL (2.4 mg total) under the skin once a week     No current facility-administered medications on file prior to visit.     He has no known allergies..    Review of Systems   Constitutional:  Negative for fatigue.   Respiratory:  Negative for shortness of breath.    Cardiovascular:  Negative for chest pain and palpitations.   Gastrointestinal:  Negative for abdominal pain, constipation and diarrhea.   Endocrine: Negative for cold intolerance and heat intolerance.   Genitourinary:  Negative for difficulty urinating.   Skin:  Negative for rash.   Neurological:  Negative for headaches.   Psychiatric/Behavioral:  Negative for dysphoric mood. The patient is not nervous/anxious.        Objective:    /83   Pulse 81   Temp 97.8 °F (36.6 °C)   Resp 16   Ht 6' 1\" (1.854 m)   Wt 113 kg (249 lb 12.8 oz)   BMI 32.96 kg/m²      Physical Exam  Vitals and nursing note reviewed.   Constitutional:       General: He is not in acute " distress.     Appearance: He is well-developed. He is obese.   HENT:      Head: Normocephalic and atraumatic.   Eyes:      Conjunctiva/sclera: Conjunctivae normal.   Neck:      Thyroid: No thyromegaly.   Pulmonary:      Effort: Pulmonary effort is normal. No respiratory distress.   Skin:     Findings: No rash (visible).   Neurological:      Mental Status: He is alert and oriented to person, place, and time.   Psychiatric:         Mood and Affect: Mood normal.         Behavior: Behavior normal.

## 2025-03-18 NOTE — ASSESSMENT & PLAN NOTE
- Patient is pursuing Conservative Program  - Initial weight loss goal of 5-10% weight loss for improved health-met  - Labs reviewed: Lipid and CMP 8/14/2024. Chol and LDL elevated, which will likely improve with weight loss. Remainder of the blood work within acceptable range.    On wegovy 2.4 mg and doing well.  He has hit a plateau.  Discussed about making dietary changes. Would start with planning and eating lunch and dinner and then trying to modify snack options. Also discussed regular exercise and making it more routing.  To continue with water intake  Past medications:Naltrexone added to Wellbutrin in May 2023, but developed headaches.  - Not currently a candidate for phentermine, as he is taking Adderall.   -Medication contract signed April 27, 2023.      Initial: 318 lbs BMI 41.96  Last visit: 247.1  Current: 249.8  Change: -68.2 lbs (+2.7 lbs since the last OV)  Goal: 220 lbs

## 2025-04-22 DIAGNOSIS — R41.840 ATTENTION DEFICIT: ICD-10-CM

## 2025-04-22 DIAGNOSIS — I10 PRIMARY HYPERTENSION: ICD-10-CM

## 2025-04-23 RX ORDER — LOSARTAN POTASSIUM 50 MG/1
50 TABLET ORAL DAILY
Qty: 90 TABLET | Refills: 1 | Status: SHIPPED | OUTPATIENT
Start: 2025-04-23

## 2025-04-23 RX ORDER — DEXTROAMPHETAMINE SACCHARATE, AMPHETAMINE ASPARTATE MONOHYDRATE, DEXTROAMPHETAMINE SULFATE AND AMPHETAMINE SULFATE 5; 5; 5; 5 MG/1; MG/1; MG/1; MG/1
20 CAPSULE, EXTENDED RELEASE ORAL EVERY MORNING
Qty: 30 CAPSULE | Refills: 0 | Status: SHIPPED | OUTPATIENT
Start: 2025-04-23

## 2025-06-11 DIAGNOSIS — R41.840 ATTENTION DEFICIT: ICD-10-CM

## 2025-06-11 RX ORDER — DEXTROAMPHETAMINE SACCHARATE, AMPHETAMINE ASPARTATE MONOHYDRATE, DEXTROAMPHETAMINE SULFATE AND AMPHETAMINE SULFATE 5; 5; 5; 5 MG/1; MG/1; MG/1; MG/1
20 CAPSULE, EXTENDED RELEASE ORAL EVERY MORNING
Qty: 30 CAPSULE | Refills: 0 | Status: SHIPPED | OUTPATIENT
Start: 2025-06-11

## 2025-07-09 ENCOUNTER — RA CDI HCC (OUTPATIENT)
Dept: OTHER | Facility: HOSPITAL | Age: 30
End: 2025-07-09

## 2025-07-09 NOTE — PROGRESS NOTES
HCC coding opportunities       Chart reviewed, no opportunity found: CHART REVIEWED, NO OPPORTUNITY FOUND        Patients Insurance        Commercial Insurance: [employer]

## 2025-07-14 DIAGNOSIS — R41.840 ATTENTION DEFICIT: ICD-10-CM

## 2025-07-14 RX ORDER — DEXTROAMPHETAMINE SACCHARATE, AMPHETAMINE ASPARTATE MONOHYDRATE, DEXTROAMPHETAMINE SULFATE AND AMPHETAMINE SULFATE 5; 5; 5; 5 MG/1; MG/1; MG/1; MG/1
20 CAPSULE, EXTENDED RELEASE ORAL EVERY MORNING
Qty: 30 CAPSULE | Refills: 0 | Status: SHIPPED | OUTPATIENT
Start: 2025-07-14

## 2025-07-16 ENCOUNTER — OFFICE VISIT (OUTPATIENT)
Dept: INTERNAL MEDICINE CLINIC | Facility: OTHER | Age: 30
End: 2025-07-16
Payer: COMMERCIAL

## 2025-07-16 VITALS
DIASTOLIC BLOOD PRESSURE: 76 MMHG | BODY MASS INDEX: 35.49 KG/M2 | HEART RATE: 104 BPM | TEMPERATURE: 98.3 F | SYSTOLIC BLOOD PRESSURE: 126 MMHG | RESPIRATION RATE: 20 BRPM | HEIGHT: 73 IN | OXYGEN SATURATION: 96 % | WEIGHT: 267.8 LBS

## 2025-07-16 DIAGNOSIS — E55.9 VITAMIN D DEFICIENCY: ICD-10-CM

## 2025-07-16 DIAGNOSIS — Z13.0 SCREENING FOR DEFICIENCY ANEMIA: ICD-10-CM

## 2025-07-16 DIAGNOSIS — Z23 ENCOUNTER FOR IMMUNIZATION: ICD-10-CM

## 2025-07-16 DIAGNOSIS — Z00.00 ANNUAL PHYSICAL EXAM: Primary | ICD-10-CM

## 2025-07-16 DIAGNOSIS — I10 PRIMARY HYPERTENSION: ICD-10-CM

## 2025-07-16 DIAGNOSIS — R41.840 ATTENTION DEFICIT: ICD-10-CM

## 2025-07-16 DIAGNOSIS — E78.2 MODERATE MIXED HYPERLIPIDEMIA NOT REQUIRING STATIN THERAPY: ICD-10-CM

## 2025-07-16 DIAGNOSIS — E66.9 OBESITY (BMI 30-39.9): ICD-10-CM

## 2025-07-16 PROCEDURE — 99395 PREV VISIT EST AGE 18-39: CPT | Performed by: NURSE PRACTITIONER

## 2025-07-16 PROCEDURE — 99214 OFFICE O/P EST MOD 30 MIN: CPT | Performed by: NURSE PRACTITIONER

## 2025-07-16 PROCEDURE — 90715 TDAP VACCINE 7 YRS/> IM: CPT

## 2025-07-16 PROCEDURE — 90471 IMMUNIZATION ADMIN: CPT

## 2025-07-16 NOTE — ASSESSMENT & PLAN NOTE
Follows with medical weight management  Continue Wegovy 2.4 mg weekly  Currently encouraged to start routine exercise regimen  Continue healthy diet  Follow-up in 4 months

## 2025-07-16 NOTE — PATIENT INSTRUCTIONS
"Patient Education     Routine physical for adults   The Basics   Written by the doctors and editors at [facility]   What is a physical? -- A physical is a routine visit, or \"check-up,\" with your doctor. You might also hear it called a \"wellness visit\" or \"preventive visit.\"  During each visit, the doctor will:   Ask about your physical and mental health   Ask about your habits, behaviors, and lifestyle   Do an exam   Give you vaccines if needed   Talk to you about any medicines you take   Give advice about your health   Answer your questions  Getting regular check-ups is an important part of taking care of your health. It can help your doctor find and treat any problems you have. But it's also important for preventing health problems.  A routine physical is different from a \"sick visit.\" A sick visit is when you see a doctor because of a health concern or problem. Since physicals are scheduled ahead of time, you can think about what you want to ask the doctor.  How often should I get a physical? -- It depends on your age and health. In general, for people age 21 years and older:   If you are younger than 50 years, you might be able to get a physical every 3 years.   If you are 50 years or older, your doctor might recommend a physical every year.  If you have an ongoing health condition, like diabetes or high blood pressure, your doctor will probably want to see you more often.  What happens during a physical? -- In general, each visit will include:   Physical exam - The doctor or nurse will check your height, weight, heart rate, and blood pressure. They will also look at your eyes and ears. They will ask about how you are feeling and whether you have any symptoms that bother you.   Medicines - It's a good idea to bring a list of all the medicines you take to each doctor visit. Your doctor will talk to you about your medicines and answer any questions. Tell them if you are having any side effects that bother you. You " "should also tell them if you are having trouble paying for any of your medicines.   Habits and behaviors - This includes:   Your diet   Your exercise habits   Whether you smoke, drink alcohol, or use drugs   Whether you are sexually active   Whether you feel safe at home  Your doctor will talk to you about things you can do to improve your health and lower your risk of health problems. They will also offer help and support. For example, if you want to quit smoking, they can give you advice and might prescribe medicines. If you want to improve your diet or get more physical activity, they can help you with this, too.   Lab tests, if needed - The tests you get will depend on your age and situation. For example, your doctor might want to check your:   Cholesterol   Blood sugar   Iron level   Vaccines - The recommended vaccines will depend on your age, health, and what vaccines you already had. Vaccines are very important because they can prevent certain serious or deadly infections.   Discussion of screening - \"Screening\" means checking for diseases or other health problems before they cause symptoms. Your doctor can recommend screening based on your age, risk, and preferences. This might include tests to check for:   Cancer, such as breast, prostate, cervical, ovarian, colorectal, prostate, lung, or skin cancer   Sexually transmitted infections, such as chlamydia and gonorrhea   Mental health conditions like depression and anxiety  Your doctor will talk to you about the different types of screening tests. They can help you decide which screenings to have. They can also explain what the results might mean.   Answering questions - The physical is a good time to ask the doctor or nurse questions about your health. If needed, they can refer you to other doctors or specialists, too.  Adults older than 65 years often need other care, too. As you get older, your doctor will talk to you about:   How to prevent falling at " home   Hearing or vision tests   Memory testing   How to take your medicines safely   Making sure that you have the help and support you need at home  All topics are updated as new evidence becomes available and our peer review process is complete.  This topic retrieved from [employer] on: May 02, 2024.  Topic 316094 Version 1.0  Release: 32.4.3 - C32.122  © 2024 UpToDate, Inc. and/or its affiliates. All rights reserved.  Consumer Information Use and Disclaimer   Disclaimer: This generalized information is a limited summary of diagnosis, treatment, and/or medication information. It is not meant to be comprehensive and should be used as a tool to help the user understand and/or assess potential diagnostic and treatment options. It does NOT include all information about conditions, treatments, medications, side effects, or risks that may apply to a specific patient. It is not intended to be medical advice or a substitute for the medical advice, diagnosis, or treatment of a health care provider based on the health care provider's examination and assessment of a patient's specific and unique circumstances. Patients must speak with a health care provider for complete information about their health, medical questions, and treatment options, including any risks or benefits regarding use of medications. This information does not endorse any treatments or medications as safe, effective, or approved for treating a specific patient. UpToDate, Inc. and its affiliates disclaim any warranty or liability relating to this information or the use thereof.The use of this information is governed by the Terms of Use, available at https://www.w[employer] Homesuwer.com/en/know/clinical-effectiveness-terms. 2024© UpToDate, Inc. and its affiliates and/or licensors. All rights reserved.  Copyright   © 2024 UpToDate, Inc. and/or its affiliates. All rights reserved.

## 2025-07-16 NOTE — ASSESSMENT & PLAN NOTE
-Continue losartan 50 mg daily.    -Patient questioned if he would be able to ever stop antihypertensives.  Discussed importance of optimizing lifestyle.  Strongly encouraged to start routine exercise regimen, low-sodium diet less than 2000 mg daily, weight loss.   F-ollow-up in 4 months to reassess    Orders:    Comprehensive metabolic panel; Future

## 2025-07-16 NOTE — PROGRESS NOTES
Adult Annual Physical  Name: Yumi Faria      : 1995      MRN: [id number]  Encounter Provider: APRIL Sarah  Encounter Date: 2025   Encounter department: [facility]    :  Assessment & Plan  Annual physical exam  - 29-year-old male  -Encouraged monthly self testicular exams  -Tdap given in office today  -Check labs as ordered       Primary hypertension  -Continue losartan 50 mg daily.    -Patient questioned if he would be able to ever stop antihypertensives.  Discussed importance of optimizing lifestyle.  Strongly encouraged to start routine exercise regimen, low-sodium diet less than 2000 mg daily, weight loss.   F-ollow-up in 4 months to reassess    Orders:    Comprehensive metabolic panel; Future    Vitamin D deficiency  - Continue vitamin D D supplement  Orders:    Vitamin D 25 hydroxy; Future    Moderate mixed hyperlipidemia not requiring statin therapy  - Update lipid panel  -Encouraged healthy diet and routine exercise  Orders:    Comprehensive metabolic panel; Future    Lipid Panel with Direct LDL reflex; Future    TSH, 3rd generation with Free T4 reflex; Future    Screening for deficiency anemia    Orders:    CBC and differential; Future    Attention deficit  Symptoms controlled with Adderall XR 20 mg daily  Follow-up in 4 months       Obesity (BMI 30-39.9)  Follows with medical weight management  Continue Wegovy 2.4 mg weekly  Currently encouraged to start routine exercise regimen  Continue healthy diet  Follow-up in 4 months           Preventive Screenings:  - Diabetes Screening: screening up-to-date and orders placed  - Cholesterol Screening: screening not indicated, has hyperlipidemia and orders placed   - Hepatitis C screening: patient declines   - HIV screening: patient declines   - Colon cancer screening: screening not indicated   - Lung cancer screening: screening not indicated   - Prostate cancer screening: screening not indicated  "    Immunizations:  - Immunizations due: Tdap and Hepatitis A  - Immunizations given per orders           History of Present Illness     Adult Annual Physical:  Patient presents for annual physical. Patient presents today for annual physical and 6 month follow up    HTN - home readings controlled 120/80, he is compliant with losartan 50mg daily     Obesity - he follows with bariatrics. He continues on wegovy 2.4mg weekly. He states his diet has been worse, eating out a lot with traveling for work. He did not realize he was gaining weight.    ADD- he continues on Adderall 20mg daily. He feels he functions \"normally\" when he is taking the medication. If he misses a dose, he quickly notices the effects, he is much less productive and has trouble completing tasks.   .     Diet and Physical Activity:  - Diet/Nutrition: portion control and limited junk food.  - Exercise: no formal exercise, walking and less than 30 minutes on average.    Depression Screening:  - PHQ-2 Score: 0    General Health:  - Sleep: 7-8 hours of sleep on average.  - Hearing: normal hearing bilateral ears.  - Vision: most recent eye exam < 1 year ago. Small glasses prescription, not worn regularly.  - Dental: brushes teeth twice daily, floss regularly and no dental visits for > 1 year.    /GYN Health:  - Follows with GYN: no.   - History of STDs: no    Advanced Care Planning:  - Has an advanced directive?: no    - Has a durable medical POA?: no      Review of Systems   Constitutional:  Negative for activity change, appetite change, chills, diaphoresis, fatigue, fever and unexpected weight change.   Eyes:  Negative for visual disturbance.   Respiratory:  Negative for cough, chest tightness, shortness of breath and wheezing.    Cardiovascular:  Negative for chest pain and palpitations.   Gastrointestinal:  Negative for abdominal distention, abdominal pain, blood in stool, constipation, diarrhea, nausea and vomiting.   Genitourinary:  Negative for " "difficulty urinating, dysuria, frequency, hematuria, penile swelling, scrotal swelling and testicular pain.   Neurological:  Negative for dizziness, light-headedness and headaches.   Psychiatric/Behavioral:  Positive for decreased concentration. Negative for dysphoric mood and sleep disturbance. The patient is not nervous/anxious.      Medical History Reviewed by provider this encounter:     .  Past Medical History   Past Medical History[1]  Past Surgical History[2]  Family History[3]   reports that he has never smoked. He has never used smokeless tobacco. He reports that he does not drink alcohol and does not use drugs.  Current Outpatient Medications   Medication Instructions    amphetamine-dextroamphetamine (ADDERALL XR, 20MG,) 20 MG 24 hr capsule 20 mg, Oral, Every morning    ibuprofen (MOTRIN) 600 mg tablet As needed    losartan (COZAAR) 50 mg, Oral, Daily    VITAMIN D PO Take by mouth    Wegovy 2.4 mg, Subcutaneous, Weekly   Allergies[4]   Medications Ordered Prior to Encounter[5]   Social History[6]    Objective   /76 (BP Location: Left arm, Patient Position: Sitting, Cuff Size: Large)   Pulse 104   Temp 98.3 °F (36.8 °C) (Temporal)   Resp 20   Ht 6' 1\" (1.854 m)   Wt 121 kg (267 lb 12.8 oz)   SpO2 96%   BMI 35.33 kg/m²     Physical Exam  Constitutional:       General: He is not in acute distress.     Appearance: Normal appearance. He is well-developed. He is obese. He is not diaphoretic.   HENT:      Head: Normocephalic and atraumatic.      Right Ear: Tympanic membrane and external ear normal.      Left Ear: Tympanic membrane and external ear normal.      Nose: Nose normal. No mucosal edema, congestion or rhinorrhea.      Mouth/Throat:      Mouth: Mucous membranes are moist.      Pharynx: Oropharynx is clear. No oropharyngeal exudate or posterior oropharyngeal erythema.     Eyes:      Extraocular Movements: Extraocular movements intact.      Conjunctiva/sclera: Conjunctivae normal.      Pupils: " Pupils are equal, round, and reactive to light.     Neck:      Thyroid: No thyromegaly.     Cardiovascular:      Rate and Rhythm: Normal rate and regular rhythm.      Heart sounds: Normal heart sounds. No murmur heard.  Pulmonary:      Effort: Pulmonary effort is normal. No respiratory distress.      Breath sounds: Normal breath sounds. No decreased breath sounds, wheezing, rhonchi or rales.   Abdominal:      General: Abdomen is flat. Bowel sounds are normal. There is no distension.      Palpations: Abdomen is soft. There is no mass.      Tenderness: There is no abdominal tenderness. There is no guarding.   Genitourinary:     Comments: Pt deferred    Musculoskeletal:         General: No tenderness. Normal range of motion.      Cervical back: Normal range of motion and neck supple. No edema.      Right lower leg: No edema.      Left lower leg: No edema.   Lymphadenopathy:      Cervical: No cervical adenopathy.      Upper Body:      Right upper body: No supraclavicular, axillary, pectoral or epitrochlear adenopathy.      Left upper body: No supraclavicular, axillary, pectoral or epitrochlear adenopathy.     Skin:     General: Skin is warm.      Findings: No rash.     Neurological:      Mental Status: He is alert and oriented to person, place, and time. Mental status is at baseline.      Motor: No weakness or abnormal muscle tone.      Gait: Gait normal.      Deep Tendon Reflexes: Reflexes are normal and symmetric.     Psychiatric:         Mood and Affect: Mood normal.         Behavior: Behavior normal.         Thought Content: Thought content normal.         Judgment: Judgment normal.              [1]   Past Medical History:  Diagnosis Date    ADHD    [2]   Past Surgical History:  Procedure Laterality Date    NO PAST SURGERIES      ROOT CANAL  11/2022   [3]   Family History  Problem Relation Name Age of Onset    Asthma Mother Roseanne     Hypertension Mother Roseanne     Heart attack Mother Roseanne     Coronary artery disease  Father Antonio     Snoring Father Antonio     Asthma Brother Tamika     Crohn's disease Brother Tamika     Cancer Neg Hx      Diabetes Neg Hx      Stroke Neg Hx      Thyroid disease Neg Hx     [4] No Known Allergies  [5]   Current Outpatient Medications on File Prior to Visit   Medication Sig Dispense Refill    amphetamine-dextroamphetamine (ADDERALL XR, 20MG,) 20 MG 24 hr capsule Take 1 capsule (20 mg total) by mouth every morning Max Daily Amount: 20 mg 30 capsule 0    ibuprofen (MOTRIN) 600 mg tablet as needed      losartan (COZAAR) 50 mg tablet Take 1 tablet (50 mg total) by mouth daily 90 tablet 1    Semaglutide-Weight Management (Wegovy) 2.4 MG/0.75ML Inject 0.75 mL (2.4 mg total) under the skin once a week 2 mL 6    VITAMIN D PO Take by mouth       No current facility-administered medications on file prior to visit.   [6]   Social History  Tobacco Use    Smoking status: Never    Smokeless tobacco: Never   Vaping Use    Vaping status: Never Used   Substance and Sexual Activity    Alcohol use: Never    Drug use: Never    Sexual activity: Not Currently     Partners: Female     Birth control/protection: Condom Male

## 2025-07-16 NOTE — ASSESSMENT & PLAN NOTE
- Update lipid panel  -Encouraged healthy diet and routine exercise  Orders:    Comprehensive metabolic panel; Future    Lipid Panel with Direct LDL reflex; Future    TSH, 3rd generation with Free T4 reflex; Future